# Patient Record
Sex: FEMALE | Race: BLACK OR AFRICAN AMERICAN | NOT HISPANIC OR LATINO
[De-identification: names, ages, dates, MRNs, and addresses within clinical notes are randomized per-mention and may not be internally consistent; named-entity substitution may affect disease eponyms.]

---

## 2017-04-04 ENCOUNTER — EXTERNAL RECORD (OUTPATIENT)
Dept: HEALTH INFORMATION MANAGEMENT | Facility: OTHER | Age: 52
End: 2017-04-04

## 2017-04-29 ENCOUNTER — EXTERNAL RECORD (OUTPATIENT)
Dept: HEALTH INFORMATION MANAGEMENT | Facility: OTHER | Age: 52
End: 2017-04-29

## 2017-04-30 ENCOUNTER — EXTERNAL RECORD (OUTPATIENT)
Dept: HEALTH INFORMATION MANAGEMENT | Facility: OTHER | Age: 52
End: 2017-04-30

## 2017-04-30 ENCOUNTER — EXTERNAL LAB (OUTPATIENT)
Dept: OTHER | Age: 52
End: 2017-04-30

## 2017-04-30 LAB — LAB RESULT: NORMAL

## 2017-08-21 ENCOUNTER — EXTERNAL RECORD (OUTPATIENT)
Dept: HEALTH INFORMATION MANAGEMENT | Facility: OTHER | Age: 52
End: 2017-08-21

## 2017-09-13 ENCOUNTER — EXTERNAL RECORD (OUTPATIENT)
Dept: HEALTH INFORMATION MANAGEMENT | Facility: OTHER | Age: 52
End: 2017-09-13

## 2017-09-25 ENCOUNTER — EXTERNAL RECORD (OUTPATIENT)
Dept: HEALTH INFORMATION MANAGEMENT | Facility: OTHER | Age: 52
End: 2017-09-25

## 2017-11-13 ENCOUNTER — EXTERNAL RECORD (OUTPATIENT)
Dept: HEALTH INFORMATION MANAGEMENT | Facility: OTHER | Age: 52
End: 2017-11-13

## 2017-11-27 ENCOUNTER — EXTERNAL RECORD (OUTPATIENT)
Dept: OTHER | Age: 52
End: 2017-11-27

## 2017-11-27 ENCOUNTER — EXTERNAL LAB (OUTPATIENT)
Dept: OTHER | Age: 52
End: 2017-11-27

## 2017-11-27 ENCOUNTER — EXTERNAL RECORD (OUTPATIENT)
Dept: HEALTH INFORMATION MANAGEMENT | Facility: OTHER | Age: 52
End: 2017-11-27

## 2017-11-27 LAB — LAB RESULT: NORMAL

## 2017-12-01 ENCOUNTER — EXTERNAL RECORD (OUTPATIENT)
Dept: HEALTH INFORMATION MANAGEMENT | Facility: OTHER | Age: 52
End: 2017-12-01

## 2018-01-15 ENCOUNTER — EXTERNAL RECORD (OUTPATIENT)
Dept: HEALTH INFORMATION MANAGEMENT | Facility: OTHER | Age: 53
End: 2018-01-15

## 2018-04-24 ENCOUNTER — EXTERNAL RECORD (OUTPATIENT)
Dept: HEALTH INFORMATION MANAGEMENT | Facility: OTHER | Age: 53
End: 2018-04-24

## 2018-12-05 ENCOUNTER — EXTERNAL RECORD (OUTPATIENT)
Dept: HEALTH INFORMATION MANAGEMENT | Facility: OTHER | Age: 53
End: 2018-12-05

## 2020-09-28 ENCOUNTER — EXTERNAL RECORD (OUTPATIENT)
Dept: HEALTH INFORMATION MANAGEMENT | Facility: OTHER | Age: 55
End: 2020-09-28

## 2021-05-21 LAB — COLONOSCOPY STUDY: NORMAL

## 2021-07-22 ENCOUNTER — EXTERNAL RECORD (OUTPATIENT)
Dept: HEALTH INFORMATION MANAGEMENT | Facility: OTHER | Age: 56
End: 2021-07-22

## 2021-07-26 ENCOUNTER — EXTERNAL RECORD (OUTPATIENT)
Dept: HEALTH INFORMATION MANAGEMENT | Facility: OTHER | Age: 56
End: 2021-07-26

## 2021-12-18 LAB — LAB RESULT: NORMAL

## 2021-12-21 ENCOUNTER — EXTERNAL RECORD (OUTPATIENT)
Dept: OTHER | Age: 56
End: 2021-12-21

## 2022-11-10 ENCOUNTER — OFFICE VISIT (OUTPATIENT)
Dept: INTERNAL MEDICINE | Age: 57
End: 2022-11-10

## 2022-11-10 VITALS
RESPIRATION RATE: 18 BRPM | TEMPERATURE: 96.9 F | HEART RATE: 80 BPM | HEIGHT: 66 IN | DIASTOLIC BLOOD PRESSURE: 80 MMHG | SYSTOLIC BLOOD PRESSURE: 152 MMHG | WEIGHT: 238 LBS | BODY MASS INDEX: 38.25 KG/M2 | OXYGEN SATURATION: 95 %

## 2022-11-10 DIAGNOSIS — E55.9 VITAMIN D DEFICIENCY: ICD-10-CM

## 2022-11-10 DIAGNOSIS — E53.8 VITAMIN B12 DEFICIENCY: ICD-10-CM

## 2022-11-10 DIAGNOSIS — R73.01 IMPAIRED FASTING GLUCOSE: ICD-10-CM

## 2022-11-10 DIAGNOSIS — E66.9 OBESITY WITH BODY MASS INDEX 30 OR GREATER: ICD-10-CM

## 2022-11-10 DIAGNOSIS — I10 HYPERTENSION, UNSPECIFIED TYPE: ICD-10-CM

## 2022-11-10 DIAGNOSIS — G47.30 SLEEP APNEA, UNSPECIFIED TYPE: ICD-10-CM

## 2022-11-10 DIAGNOSIS — N39.41 URGE INCONTINENCE OF URINE: ICD-10-CM

## 2022-11-10 DIAGNOSIS — E78.2 MIXED HYPERLIPIDEMIA: ICD-10-CM

## 2022-11-10 DIAGNOSIS — Z12.31 ENCOUNTER FOR SCREENING MAMMOGRAM FOR MALIGNANT NEOPLASM OF BREAST: ICD-10-CM

## 2022-11-10 DIAGNOSIS — Z00.00 ANNUAL PHYSICAL EXAM: Primary | ICD-10-CM

## 2022-11-10 DIAGNOSIS — Z82.49 FAMILY HISTORY OF BRAIN ANEURYSM: ICD-10-CM

## 2022-11-10 PROBLEM — M76.829 TIBIALIS POSTERIOR TENDINITIS: Status: ACTIVE | Noted: 2022-11-10

## 2022-11-10 PROBLEM — Z96.652 STATUS POST TOTAL LEFT KNEE REPLACEMENT: Status: ACTIVE | Noted: 2020-09-29

## 2022-11-10 PROBLEM — Z96.652 STATUS POST TOTAL LEFT KNEE REPLACEMENT: Status: RESOLVED | Noted: 2020-09-29 | Resolved: 2022-11-10

## 2022-11-10 PROBLEM — M76.829 TIBIALIS POSTERIOR TENDINITIS: Status: RESOLVED | Noted: 2022-11-10 | Resolved: 2022-11-10

## 2022-11-10 PROCEDURE — 93000 ELECTROCARDIOGRAM COMPLETE: CPT | Performed by: NURSE PRACTITIONER

## 2022-11-10 PROCEDURE — 3077F SYST BP >= 140 MM HG: CPT | Performed by: NURSE PRACTITIONER

## 2022-11-10 PROCEDURE — 99386 PREV VISIT NEW AGE 40-64: CPT | Performed by: NURSE PRACTITIONER

## 2022-11-10 PROCEDURE — 90471 IMMUNIZATION ADMIN: CPT | Performed by: NURSE PRACTITIONER

## 2022-11-10 PROCEDURE — 90750 HZV VACC RECOMBINANT IM: CPT | Performed by: NURSE PRACTITIONER

## 2022-11-10 PROCEDURE — 3079F DIAST BP 80-89 MM HG: CPT | Performed by: NURSE PRACTITIONER

## 2022-11-10 RX ORDER — TROSPIUM CHLORIDE ER 60 MG/1
60 CAPSULE ORAL
COMMUNITY

## 2022-11-10 RX ORDER — LOSARTAN POTASSIUM AND HYDROCHLOROTHIAZIDE 12.5; 5 MG/1; MG/1
1.5 TABLET ORAL DAILY
Qty: 135 TABLET | Refills: 1 | Status: SHIPPED | OUTPATIENT
Start: 2022-11-10 | End: 2023-06-07

## 2022-11-10 RX ORDER — TOPIRAMATE 25 MG/1
TABLET ORAL
COMMUNITY
End: 2022-11-10

## 2022-11-10 RX ORDER — LOSARTAN POTASSIUM AND HYDROCHLOROTHIAZIDE 12.5; 5 MG/1; MG/1
1.5 TABLET ORAL DAILY
COMMUNITY
End: 2022-11-10 | Stop reason: SDUPTHER

## 2022-11-10 RX ORDER — MULTIVIT-MIN/IRON/FOLIC ACID/K 18-600-40
CAPSULE ORAL
COMMUNITY

## 2022-11-10 RX ORDER — UBIDECARENONE 75 MG
50 CAPSULE ORAL DAILY
COMMUNITY

## 2022-11-10 RX ORDER — ROSUVASTATIN CALCIUM 10 MG/1
10 TABLET, COATED ORAL DAILY
COMMUNITY
End: 2023-03-07

## 2022-11-10 ASSESSMENT — ENCOUNTER SYMPTOMS
EYE REDNESS: 0
BACK PAIN: 0
WEAKNESS: 0
WOUND: 0
COUGH: 0
FATIGUE: 0
VOMITING: 0
BLOOD IN STOOL: 0
BRUISES/BLEEDS EASILY: 0
NERVOUS/ANXIOUS: 0
HEADACHES: 0
TROUBLE SWALLOWING: 0
APPETITE CHANGE: 0
POLYDIPSIA: 0
DIZZINESS: 0
UNEXPECTED WEIGHT CHANGE: 0
POLYPHAGIA: 0
SINUS PAIN: 0
CHEST TIGHTNESS: 0
ABDOMINAL PAIN: 0
DIARRHEA: 0
WHEEZING: 0
SHORTNESS OF BREATH: 0
SINUS PRESSURE: 0
CONSTIPATION: 0
SLEEP DISTURBANCE: 0
NAUSEA: 0
FEVER: 0
EYE PAIN: 0
CHILLS: 0
NUMBNESS: 0
SEIZURES: 0

## 2022-11-10 ASSESSMENT — PATIENT HEALTH QUESTIONNAIRE - PHQ9
CLINICAL INTERPRETATION OF PHQ2 SCORE: NO FURTHER SCREENING NEEDED
SUM OF ALL RESPONSES TO PHQ9 QUESTIONS 1 AND 2: 0
2. FEELING DOWN, DEPRESSED OR HOPELESS: NOT AT ALL
SUM OF ALL RESPONSES TO PHQ9 QUESTIONS 1 AND 2: 0
1. LITTLE INTEREST OR PLEASURE IN DOING THINGS: NOT AT ALL

## 2022-11-30 ENCOUNTER — TELEPHONE (OUTPATIENT)
Dept: SCHEDULING | Age: 57
End: 2022-11-30

## 2022-11-30 ENCOUNTER — TELEPHONE (OUTPATIENT)
Dept: INTERNAL MEDICINE | Age: 57
End: 2022-11-30

## 2022-11-30 ENCOUNTER — LAB SERVICES (OUTPATIENT)
Dept: INTERNAL MEDICINE | Age: 57
End: 2022-11-30

## 2022-11-30 DIAGNOSIS — Z00.00 ANNUAL PHYSICAL EXAM: ICD-10-CM

## 2022-11-30 DIAGNOSIS — E53.8 VITAMIN B12 DEFICIENCY: ICD-10-CM

## 2022-11-30 DIAGNOSIS — R73.01 IMPAIRED FASTING GLUCOSE: ICD-10-CM

## 2022-11-30 DIAGNOSIS — I10 HYPERTENSION, UNSPECIFIED TYPE: ICD-10-CM

## 2022-11-30 DIAGNOSIS — Z12.11 COLON CANCER SCREENING: Primary | ICD-10-CM

## 2022-11-30 DIAGNOSIS — Z12.11 COLON CANCER SCREENING: ICD-10-CM

## 2022-11-30 DIAGNOSIS — E55.9 VITAMIN D DEFICIENCY: ICD-10-CM

## 2022-11-30 PROCEDURE — 81003 URINALYSIS AUTO W/O SCOPE: CPT | Performed by: INTERNAL MEDICINE

## 2022-11-30 PROCEDURE — 83036 HEMOGLOBIN GLYCOSYLATED A1C: CPT | Performed by: INTERNAL MEDICINE

## 2022-11-30 PROCEDURE — 82607 VITAMIN B-12: CPT | Performed by: INTERNAL MEDICINE

## 2022-11-30 PROCEDURE — 82306 VITAMIN D 25 HYDROXY: CPT | Performed by: INTERNAL MEDICINE

## 2022-11-30 PROCEDURE — 80050 GENERAL HEALTH PANEL: CPT | Performed by: INTERNAL MEDICINE

## 2022-11-30 PROCEDURE — 36415 COLL VENOUS BLD VENIPUNCTURE: CPT | Performed by: NURSE PRACTITIONER

## 2022-11-30 PROCEDURE — 80061 LIPID PANEL: CPT | Performed by: INTERNAL MEDICINE

## 2022-12-01 ENCOUNTER — TELEPHONE (OUTPATIENT)
Dept: INTERNAL MEDICINE | Age: 57
End: 2022-12-01

## 2022-12-01 DIAGNOSIS — Z82.49 FAMILY HISTORY OF BRAIN ANEURYSM: Primary | ICD-10-CM

## 2022-12-01 DIAGNOSIS — R07.89 ATYPICAL CHEST PAIN: ICD-10-CM

## 2022-12-01 LAB
25(OH)D3+25(OH)D2 SERPL-MCNC: 41.8 NG/ML (ref 30–100)
ALBUMIN SERPL-MCNC: 4.2 G/DL (ref 3.6–5.1)
ALBUMIN/GLOB SERPL: 1.3 {RATIO} (ref 1–2.4)
ALP SERPL-CCNC: 94 UNITS/L (ref 45–117)
ALT SERPL-CCNC: 29 UNITS/L
ANION GAP SERPL CALC-SCNC: 10 MMOL/L (ref 7–19)
APPEARANCE UR: CLEAR
AST SERPL-CCNC: 15 UNITS/L
BASOPHILS # BLD: 0 K/MCL (ref 0–0.3)
BASOPHILS NFR BLD: 1 %
BILIRUB SERPL-MCNC: 0.5 MG/DL (ref 0.2–1)
BILIRUB UR QL STRIP: NEGATIVE
BUN SERPL-MCNC: 21 MG/DL (ref 6–20)
BUN/CREAT SERPL: 34 (ref 7–25)
CALCIUM SERPL-MCNC: 9.4 MG/DL (ref 8.4–10.2)
CHLORIDE SERPL-SCNC: 103 MMOL/L (ref 97–110)
CHOLEST SERPL-MCNC: 224 MG/DL
CHOLEST/HDLC SERPL: 2.8 {RATIO}
CO2 SERPL-SCNC: 30 MMOL/L (ref 21–32)
COLOR UR: ABNORMAL
CREAT SERPL-MCNC: 0.62 MG/DL (ref 0.51–0.95)
DEPRECATED RDW RBC: 39.7 FL (ref 39–50)
EOSINOPHIL # BLD: 0.1 K/MCL (ref 0–0.5)
EOSINOPHIL NFR BLD: 2 %
ERYTHROCYTE [DISTWIDTH] IN BLOOD: 12.3 % (ref 11–15)
FASTING DURATION TIME PATIENT: ABNORMAL H
FASTING DURATION TIME PATIENT: ABNORMAL H
GFR SERPLBLD BASED ON 1.73 SQ M-ARVRAT: >90 ML/MIN
GLOBULIN SER-MCNC: 3.3 G/DL (ref 2–4)
GLUCOSE SERPL-MCNC: 107 MG/DL (ref 70–99)
GLUCOSE UR STRIP-MCNC: NEGATIVE MG/DL
HBA1C MFR BLD: 5.7 % (ref 4.5–5.6)
HCT VFR BLD CALC: 41.2 % (ref 36–46.5)
HDLC SERPL-MCNC: 80 MG/DL
HGB BLD-MCNC: 14.2 G/DL (ref 12–15.5)
HGB UR QL STRIP: NEGATIVE
IMM GRANULOCYTES # BLD AUTO: 0 K/MCL (ref 0–0.2)
IMM GRANULOCYTES # BLD: 0 %
KETONES UR STRIP-MCNC: NEGATIVE MG/DL
LDLC SERPL CALC-MCNC: 122 MG/DL
LEUKOCYTE ESTERASE UR QL STRIP: NEGATIVE
LYMPHOCYTES # BLD: 2 K/MCL (ref 1–4)
LYMPHOCYTES NFR BLD: 48 %
MCH RBC QN AUTO: 30.4 PG (ref 26–34)
MCHC RBC AUTO-ENTMCNC: 34.5 G/DL (ref 32–36.5)
MCV RBC AUTO: 88.2 FL (ref 78–100)
MONOCYTES # BLD: 0.4 K/MCL (ref 0.3–0.9)
MONOCYTES NFR BLD: 10 %
NEUTROPHILS # BLD: 1.7 K/MCL (ref 1.8–7.7)
NEUTROPHILS NFR BLD: 39 %
NITRITE UR QL STRIP: NEGATIVE
NONHDLC SERPL-MCNC: 144 MG/DL
NRBC BLD MANUAL-RTO: 0 /100 WBC
PH UR STRIP: 6 [PH] (ref 5–7)
PLATELET # BLD AUTO: 366 K/MCL (ref 140–450)
POTASSIUM SERPL-SCNC: 3.9 MMOL/L (ref 3.4–5.1)
PROT SERPL-MCNC: 7.5 G/DL (ref 6.4–8.2)
PROT UR STRIP-MCNC: ABNORMAL MG/DL
RBC # BLD: 4.67 MIL/MCL (ref 4–5.2)
SODIUM SERPL-SCNC: 139 MMOL/L (ref 135–145)
SP GR UR STRIP: 1.03 (ref 1–1.03)
TRIGL SERPL-MCNC: 109 MG/DL
TSH SERPL-ACNC: 1.2 MCUNITS/ML (ref 0.35–5)
UROBILINOGEN UR STRIP-MCNC: 0.2 MG/DL
VIT B12 SERPL-MCNC: 1214 PG/ML (ref 211–911)
WBC # BLD: 4.3 K/MCL (ref 4.2–11)

## 2022-12-13 ENCOUNTER — EXTERNAL RECORD (OUTPATIENT)
Dept: OTHER | Age: 57
End: 2022-12-13

## 2022-12-15 ENCOUNTER — TELEPHONE (OUTPATIENT)
Dept: SCHEDULING | Age: 57
End: 2022-12-15

## 2022-12-15 ENCOUNTER — APPOINTMENT (OUTPATIENT)
Dept: CARDIOLOGY | Age: 57
End: 2022-12-15
Attending: NURSE PRACTITIONER

## 2022-12-16 ENCOUNTER — APPOINTMENT (OUTPATIENT)
Dept: CARDIOLOGY | Age: 57
End: 2022-12-16
Attending: NURSE PRACTITIONER

## 2022-12-20 ENCOUNTER — ANCILLARY PROCEDURE (OUTPATIENT)
Dept: CARDIOLOGY | Age: 57
End: 2022-12-20
Attending: NURSE PRACTITIONER

## 2022-12-20 ENCOUNTER — TELEPHONE (OUTPATIENT)
Dept: INTERNAL MEDICINE | Age: 57
End: 2022-12-20

## 2022-12-20 DIAGNOSIS — R07.89 ATYPICAL CHEST PAIN: ICD-10-CM

## 2022-12-20 DIAGNOSIS — R94.39 ABNORMAL STRESS TEST: Primary | ICD-10-CM

## 2022-12-20 LAB
HEART RATE RESERVE PREDICTED: 6.67 BPM
HM MAMMOGRAPHY BILATERAL: NORMAL
RESTING HR ACHIEVED: 72 BPM
STRESS BASELINE BP: NORMAL MMHG
STRESS PEAK HR: 154 BPM
STRESS PERCENT HR: 93 %
STRESS POST ESTIMATED WORKLOAD: 7 METS
STRESS POST EXERCISE DUR MIN: 5 MIN
STRESS POST PEAK BP: NORMAL MMHG
STRESS TARGET HR: 165 BPM

## 2022-12-20 PROCEDURE — 93015 CV STRESS TEST SUPVJ I&R: CPT | Performed by: INTERNAL MEDICINE

## 2022-12-29 ENCOUNTER — APPOINTMENT (OUTPATIENT)
Dept: CARDIOLOGY | Age: 57
End: 2022-12-29
Attending: NURSE PRACTITIONER

## 2022-12-29 ENCOUNTER — APPOINTMENT (OUTPATIENT)
Dept: NUCLEAR MEDICINE | Age: 57
End: 2022-12-29
Attending: NURSE PRACTITIONER

## 2022-12-31 ENCOUNTER — HOSPITAL ENCOUNTER (OUTPATIENT)
Dept: NUCLEAR MEDICINE | Age: 57
Discharge: HOME | End: 2022-12-31
Attending: NURSE PRACTITIONER

## 2022-12-31 ENCOUNTER — HOSPITAL ENCOUNTER (OUTPATIENT)
Dept: CARDIOLOGY | Age: 57
Discharge: HOME | End: 2022-12-31
Attending: NURSE PRACTITIONER

## 2022-12-31 VITALS — DIASTOLIC BLOOD PRESSURE: 106 MMHG | SYSTOLIC BLOOD PRESSURE: 163 MMHG | HEART RATE: 59 BPM

## 2022-12-31 DIAGNOSIS — R94.39 ABNORMAL STRESS TEST: ICD-10-CM

## 2022-12-31 DIAGNOSIS — R07.89 ATYPICAL CHEST PAIN: ICD-10-CM

## 2022-12-31 LAB — STRESS TARGET HR: 165 BPM

## 2022-12-31 PROCEDURE — 93016 CV STRESS TEST SUPVJ ONLY: CPT | Performed by: INTERNAL MEDICINE

## 2022-12-31 PROCEDURE — 93018 CV STRESS TEST I&R ONLY: CPT | Performed by: INTERNAL MEDICINE

## 2022-12-31 PROCEDURE — 93017 CV STRESS TEST TRACING ONLY: CPT

## 2022-12-31 PROCEDURE — 10006150 HB RX 343: Performed by: NURSE PRACTITIONER

## 2022-12-31 PROCEDURE — 78452 HT MUSCLE IMAGE SPECT MULT: CPT

## 2022-12-31 PROCEDURE — A9500 TC99M SESTAMIBI: HCPCS | Performed by: NURSE PRACTITIONER

## 2022-12-31 RX ORDER — TETRAKIS(2-METHOXYISOBUTYLISOCYANIDE)COPPER(I) TETRAFLUOROBORATE 1 MG/ML
18.8 INJECTION, POWDER, LYOPHILIZED, FOR SOLUTION INTRAVENOUS ONCE
Status: COMPLETED | OUTPATIENT
Start: 2022-12-31 | End: 2022-12-31

## 2022-12-31 RX ORDER — TETRAKIS(2-METHOXYISOBUTYLISOCYANIDE)COPPER(I) TETRAFLUOROBORATE 1 MG/ML
6.5 INJECTION, POWDER, LYOPHILIZED, FOR SOLUTION INTRAVENOUS ONCE
Status: COMPLETED | OUTPATIENT
Start: 2022-12-31 | End: 2022-12-31

## 2022-12-31 RX ADMIN — TETRAKIS(2-METHOXYISOBUTYLISOCYANIDE)COPPER(I) TETRAFLUOROBORATE 18.8 MILLICURIE: 1 INJECTION, POWDER, LYOPHILIZED, FOR SOLUTION INTRAVENOUS at 08:50

## 2022-12-31 RX ADMIN — TETRAKIS(2-METHOXYISOBUTYLISOCYANIDE)COPPER(I) TETRAFLUOROBORATE 6.5 MILLICURIE: 1 INJECTION, POWDER, LYOPHILIZED, FOR SOLUTION INTRAVENOUS at 07:05

## 2023-01-01 ENCOUNTER — EXTERNAL RECORD (OUTPATIENT)
Dept: OTHER | Age: 58
End: 2023-01-01

## 2023-01-06 ENCOUNTER — APPOINTMENT (OUTPATIENT)
Dept: CARDIOLOGY | Age: 58
End: 2023-01-06
Attending: NURSE PRACTITIONER

## 2023-03-07 RX ORDER — ROSUVASTATIN CALCIUM 10 MG/1
TABLET, COATED ORAL
Qty: 60 TABLET | Refills: 0 | Status: SHIPPED | OUTPATIENT
Start: 2023-03-07

## 2023-03-16 ENCOUNTER — TELEPHONE (OUTPATIENT)
Dept: INTERNAL MEDICINE | Age: 58
End: 2023-03-16

## 2023-03-16 RX ORDER — ADHESIVE BANDAGE 3/4"
BANDAGE TOPICAL
Qty: 1 EACH | Refills: 1 | Status: SHIPPED | OUTPATIENT
Start: 2023-03-16

## 2023-03-17 ENCOUNTER — TELEPHONE (OUTPATIENT)
Dept: INTERNAL MEDICINE | Age: 58
End: 2023-03-17

## 2023-04-04 ENCOUNTER — APPOINTMENT (OUTPATIENT)
Dept: INTERNAL MEDICINE | Age: 58
End: 2023-04-04

## 2023-04-07 ENCOUNTER — OFFICE VISIT (OUTPATIENT)
Dept: INTERNAL MEDICINE | Age: 58
End: 2023-04-07

## 2023-04-07 VITALS
DIASTOLIC BLOOD PRESSURE: 72 MMHG | HEIGHT: 66 IN | BODY MASS INDEX: 36.16 KG/M2 | SYSTOLIC BLOOD PRESSURE: 130 MMHG | TEMPERATURE: 97 F | WEIGHT: 225 LBS

## 2023-04-07 DIAGNOSIS — I10 HYPERTENSION, UNSPECIFIED TYPE: ICD-10-CM

## 2023-04-07 DIAGNOSIS — E66.9 OBESITY WITH BODY MASS INDEX 30 OR GREATER: ICD-10-CM

## 2023-04-07 DIAGNOSIS — R73.01 IMPAIRED FASTING GLUCOSE: ICD-10-CM

## 2023-04-07 DIAGNOSIS — E78.2 MIXED HYPERLIPIDEMIA: ICD-10-CM

## 2023-04-07 DIAGNOSIS — Z78.0 MENOPAUSE: Primary | ICD-10-CM

## 2023-04-07 DIAGNOSIS — Z23 NEED FOR SHINGLES VACCINE: ICD-10-CM

## 2023-04-07 DIAGNOSIS — Z12.4 PAP SMEAR FOR CERVICAL CANCER SCREENING: ICD-10-CM

## 2023-04-07 LAB
ALBUMIN SERPL-MCNC: 4.2 G/DL (ref 3.6–5.1)
ALBUMIN/GLOB SERPL: 1.4 {RATIO} (ref 1–2.4)
ALP SERPL-CCNC: 74 UNITS/L (ref 45–117)
ALT SERPL-CCNC: 41 UNITS/L
ANION GAP SERPL CALC-SCNC: 6 MMOL/L (ref 7–19)
AST SERPL-CCNC: 23 UNITS/L
BILIRUB SERPL-MCNC: 0.4 MG/DL (ref 0.2–1)
BUN SERPL-MCNC: 16 MG/DL (ref 6–20)
BUN/CREAT SERPL: 26 (ref 7–25)
CALCIUM SERPL-MCNC: 9.7 MG/DL (ref 8.4–10.2)
CHLORIDE SERPL-SCNC: 105 MMOL/L (ref 97–110)
CO2 SERPL-SCNC: 32 MMOL/L (ref 21–32)
CREAT SERPL-MCNC: 0.61 MG/DL (ref 0.51–0.95)
FASTING DURATION TIME PATIENT: ABNORMAL H
GFR SERPLBLD BASED ON 1.73 SQ M-ARVRAT: >90 ML/MIN
GLOBULIN SER-MCNC: 3.1 G/DL (ref 2–4)
GLUCOSE SERPL-MCNC: 108 MG/DL (ref 70–99)
HBA1C MFR BLD: 5.4 % (ref 4.5–5.6)
POTASSIUM SERPL-SCNC: 3.9 MMOL/L (ref 3.4–5.1)
PROT SERPL-MCNC: 7.3 G/DL (ref 6.4–8.2)
SODIUM SERPL-SCNC: 139 MMOL/L (ref 135–145)

## 2023-04-07 PROCEDURE — 87624 HPV HI-RISK TYP POOLED RSLT: CPT | Performed by: INTERNAL MEDICINE

## 2023-04-07 PROCEDURE — 3078F DIAST BP <80 MM HG: CPT | Performed by: NURSE PRACTITIONER

## 2023-04-07 PROCEDURE — 3075F SYST BP GE 130 - 139MM HG: CPT | Performed by: NURSE PRACTITIONER

## 2023-04-07 PROCEDURE — 36415 COLL VENOUS BLD VENIPUNCTURE: CPT | Performed by: NURSE PRACTITIONER

## 2023-04-07 PROCEDURE — 99214 OFFICE O/P EST MOD 30 MIN: CPT | Performed by: NURSE PRACTITIONER

## 2023-04-07 PROCEDURE — 80053 COMPREHEN METABOLIC PANEL: CPT | Performed by: INTERNAL MEDICINE

## 2023-04-07 PROCEDURE — 83036 HEMOGLOBIN GLYCOSYLATED A1C: CPT | Performed by: INTERNAL MEDICINE

## 2023-04-07 PROCEDURE — 80061 LIPID PANEL: CPT | Performed by: INTERNAL MEDICINE

## 2023-04-07 PROCEDURE — 88175 CYTOPATH C/V AUTO FLUID REDO: CPT | Performed by: INTERNAL MEDICINE

## 2023-04-07 RX ORDER — SEMAGLUTIDE 1.34 MG/ML
1 INJECTION, SOLUTION SUBCUTANEOUS
Qty: 3 ML | Refills: 3 | Status: SHIPPED | OUTPATIENT
Start: 2023-04-10 | End: 2023-08-02

## 2023-04-07 ASSESSMENT — PATIENT HEALTH QUESTIONNAIRE - PHQ9
SUM OF ALL RESPONSES TO PHQ9 QUESTIONS 1 AND 2: 0
SUM OF ALL RESPONSES TO PHQ9 QUESTIONS 1 AND 2: 0
1. LITTLE INTEREST OR PLEASURE IN DOING THINGS: NOT AT ALL
2. FEELING DOWN, DEPRESSED OR HOPELESS: NOT AT ALL
CLINICAL INTERPRETATION OF PHQ2 SCORE: NO FURTHER SCREENING NEEDED

## 2023-04-07 ASSESSMENT — PAIN SCALES - GENERAL: PAINLEVEL: 0

## 2023-04-08 LAB
CHOLEST SERPL-MCNC: 174 MG/DL
CHOLEST/HDLC SERPL: 2.5 {RATIO}
FASTING DURATION TIME PATIENT: NORMAL H
HDLC SERPL-MCNC: 71 MG/DL
LDLC SERPL CALC-MCNC: 86 MG/DL
NONHDLC SERPL-MCNC: 103 MG/DL
TRIGL SERPL-MCNC: 87 MG/DL

## 2023-04-10 ENCOUNTER — NURSE ONLY (OUTPATIENT)
Dept: INTERNAL MEDICINE | Age: 58
End: 2023-04-10

## 2023-04-10 DIAGNOSIS — Z23 NEED FOR SHINGLES VACCINE: Primary | ICD-10-CM

## 2023-04-10 PROCEDURE — 90750 HZV VACC RECOMBINANT IM: CPT | Performed by: NURSE PRACTITIONER

## 2023-04-10 PROCEDURE — 90471 IMMUNIZATION ADMIN: CPT | Performed by: NURSE PRACTITIONER

## 2023-04-11 LAB — HOLD SPECIMEN: NORMAL

## 2023-04-12 LAB
CASE RPRT: ABNORMAL
CLINICAL INFO: ABNORMAL
CYTOLOGY CVX/VAG STUDY: ABNORMAL
CYTOLOGY CVX/VAG STUDY: ABNORMAL
GEN CATEG CVX/VAG CYTO-IMP: ABNORMAL
HPV16+18+45 E6+E7MRNA CVX NAA+PROBE: NEGATIVE
Lab: NORMAL
PAP EDUCATIONAL NOTE: ABNORMAL
SPECIMEN ADEQUACY: ABNORMAL

## 2023-04-18 ENCOUNTER — TELEPHONE (OUTPATIENT)
Dept: INTERNAL MEDICINE | Age: 58
End: 2023-04-18

## 2023-04-20 ENCOUNTER — TELEPHONE (OUTPATIENT)
Dept: INTERNAL MEDICINE | Age: 58
End: 2023-04-20

## 2023-05-04 ENCOUNTER — CLINICAL ABSTRACT (OUTPATIENT)
Dept: INTERNAL MEDICINE | Age: 58
End: 2023-05-04

## 2023-05-08 ENCOUNTER — EXTERNAL RECORD (OUTPATIENT)
Dept: HEALTH INFORMATION MANAGEMENT | Facility: OTHER | Age: 58
End: 2023-05-08

## 2023-05-13 ENCOUNTER — EXTERNAL RECORD (OUTPATIENT)
Dept: INTERNAL MEDICINE | Age: 58
End: 2023-05-13

## 2023-05-17 ENCOUNTER — TELEPHONE (OUTPATIENT)
Dept: INTERNAL MEDICINE | Age: 58
End: 2023-05-17

## 2023-05-17 DIAGNOSIS — Z78.0 MENOPAUSE: ICD-10-CM

## 2023-06-07 RX ORDER — LOSARTAN POTASSIUM AND HYDROCHLOROTHIAZIDE 12.5; 5 MG/1; MG/1
TABLET ORAL
Qty: 135 TABLET | Refills: 1 | Status: SHIPPED | OUTPATIENT
Start: 2023-06-07 | End: 2023-10-18

## 2023-08-02 DIAGNOSIS — E66.9 OBESITY WITH BODY MASS INDEX 30 OR GREATER: ICD-10-CM

## 2023-08-02 DIAGNOSIS — R73.01 IMPAIRED FASTING GLUCOSE: ICD-10-CM

## 2023-08-02 RX ORDER — SEMAGLUTIDE 1.34 MG/ML
INJECTION, SOLUTION SUBCUTANEOUS
Qty: 3 ML | Refills: 3 | Status: SHIPPED | OUTPATIENT
Start: 2023-08-02

## 2023-08-07 ENCOUNTER — EXTERNAL RECORD (OUTPATIENT)
Dept: INTERNAL MEDICINE | Age: 58
End: 2023-08-07

## 2023-08-24 ENCOUNTER — E-ADVICE (OUTPATIENT)
Dept: INTERNAL MEDICINE | Age: 58
End: 2023-08-24

## 2023-08-24 DIAGNOSIS — G47.30 SLEEP APNEA, UNSPECIFIED TYPE: Primary | ICD-10-CM

## 2023-08-30 ENCOUNTER — E-ADVICE (OUTPATIENT)
Dept: INTERNAL MEDICINE | Age: 58
End: 2023-08-30

## 2023-08-30 ENCOUNTER — TELEPHONE (OUTPATIENT)
Dept: OBGYN | Age: 58
End: 2023-08-30

## 2023-09-06 DIAGNOSIS — Z87.891 FORMER SMOKER: Primary | ICD-10-CM

## 2023-09-14 ENCOUNTER — OFFICE VISIT (OUTPATIENT)
Dept: INTERNAL MEDICINE | Age: 58
End: 2023-09-14

## 2023-09-14 VITALS
BODY MASS INDEX: 35.67 KG/M2 | DIASTOLIC BLOOD PRESSURE: 70 MMHG | TEMPERATURE: 97 F | OXYGEN SATURATION: 96 % | HEART RATE: 69 BPM | WEIGHT: 221 LBS | SYSTOLIC BLOOD PRESSURE: 111 MMHG

## 2023-09-14 DIAGNOSIS — R39.9 UTI SYMPTOMS: ICD-10-CM

## 2023-09-14 DIAGNOSIS — I10 PRIMARY HYPERTENSION: ICD-10-CM

## 2023-09-14 DIAGNOSIS — G47.30 SLEEP APNEA, UNSPECIFIED TYPE: ICD-10-CM

## 2023-09-14 DIAGNOSIS — Z23 NEEDS FLU SHOT: ICD-10-CM

## 2023-09-14 DIAGNOSIS — R73.01 IMPAIRED FASTING GLUCOSE: ICD-10-CM

## 2023-09-14 DIAGNOSIS — R19.06 EPIGASTRIC MASS: ICD-10-CM

## 2023-09-14 DIAGNOSIS — E66.9 OBESITY WITH BODY MASS INDEX 30 OR GREATER: Primary | ICD-10-CM

## 2023-09-14 PROCEDURE — 81003 URINALYSIS AUTO W/O SCOPE: CPT | Performed by: INTERNAL MEDICINE

## 2023-09-14 PROCEDURE — 99214 OFFICE O/P EST MOD 30 MIN: CPT | Performed by: NURSE PRACTITIONER

## 2023-09-14 PROCEDURE — 90471 IMMUNIZATION ADMIN: CPT | Performed by: NURSE PRACTITIONER

## 2023-09-14 PROCEDURE — 3074F SYST BP LT 130 MM HG: CPT | Performed by: NURSE PRACTITIONER

## 2023-09-14 PROCEDURE — 90686 IIV4 VACC NO PRSV 0.5 ML IM: CPT | Performed by: NURSE PRACTITIONER

## 2023-09-14 PROCEDURE — 3078F DIAST BP <80 MM HG: CPT | Performed by: NURSE PRACTITIONER

## 2023-09-14 RX ORDER — NYSTATIN 100000 [USP'U]/G
1 POWDER TOPICAL 2 TIMES DAILY
Qty: 60 G | Refills: 1 | Status: SHIPPED | OUTPATIENT
Start: 2023-09-14

## 2023-09-15 LAB
APPEARANCE UR: CLEAR
BILIRUB UR QL STRIP: NEGATIVE
COLOR UR: YELLOW
GLUCOSE UR STRIP-MCNC: NEGATIVE MG/DL
HGB UR QL STRIP: NEGATIVE
KETONES UR STRIP-MCNC: NEGATIVE MG/DL
LEUKOCYTE ESTERASE UR QL STRIP: NEGATIVE
NITRITE UR QL STRIP: NEGATIVE
PH UR STRIP: 7.5 [PH] (ref 5–7)
PROT UR STRIP-MCNC: ABNORMAL MG/DL
SP GR UR STRIP: 1.03 (ref 1–1.03)
UROBILINOGEN UR STRIP-MCNC: 0.2 MG/DL

## 2023-09-19 ENCOUNTER — E-ADVICE (OUTPATIENT)
Dept: INTERNAL MEDICINE | Age: 58
End: 2023-09-19

## 2023-09-26 ENCOUNTER — TELEPHONE (OUTPATIENT)
Dept: INTERNAL MEDICINE | Age: 58
End: 2023-09-26

## 2023-09-26 ENCOUNTER — EXTERNAL RECORD (OUTPATIENT)
Dept: HEALTH INFORMATION MANAGEMENT | Facility: OTHER | Age: 58
End: 2023-09-26

## 2023-09-26 DIAGNOSIS — R19.06 EPIGASTRIC MASS: Primary | ICD-10-CM

## 2023-09-27 ENCOUNTER — E-ADVICE (OUTPATIENT)
Dept: INTERNAL MEDICINE | Age: 58
End: 2023-09-27

## 2023-09-28 ENCOUNTER — CLINICAL ABSTRACT (OUTPATIENT)
Dept: HEALTH INFORMATION MANAGEMENT | Facility: OTHER | Age: 58
End: 2023-09-28

## 2023-09-28 ENCOUNTER — APPOINTMENT (OUTPATIENT)
Dept: OBGYN | Age: 58
End: 2023-09-28

## 2023-09-29 ENCOUNTER — HOSPITAL ENCOUNTER (OUTPATIENT)
Dept: CARDIOLOGY | Age: 58
Discharge: HOME | End: 2023-09-29
Attending: INTERNAL MEDICINE

## 2023-09-29 ENCOUNTER — APPOINTMENT (OUTPATIENT)
Dept: GENERAL RADIOLOGY | Age: 58
End: 2023-09-29
Attending: INTERNAL MEDICINE

## 2023-09-29 ENCOUNTER — APPOINTMENT (OUTPATIENT)
Dept: RESPIRATORY THERAPY | Age: 58
End: 2023-09-29
Attending: INTERNAL MEDICINE

## 2023-09-29 DIAGNOSIS — Z87.891 FORMER SMOKER: ICD-10-CM

## 2023-09-29 LAB
AORTIC VALVE AREA (AVA): 0.84
AORTIC VALVE AREA: 2.7
ASCENDING AORTA (AAD): 3
AV MEAN GRADIENT (AVMG): 4
AV MEAN VELOCITY (AVMV): 0.93
AV PEAK GRADIENT (AVPG): 7
AV PEAK VELOCITY (AVPV): 1.34
AV STENOSIS SEVERITY TEXT: NORMAL
AVI LVOT PEAK GRADIENT (LVOTMG): 0.9
E WAVE DECELARATION TIME (MDT): 11.34
INTERVENTRICULAR SEPTUM IN END DIASTOLE (IVSD): 2.72
LEFT INTERNAL DIMENSION IN SYSTOLE (LVSD): 1
LEFT VENTRICULAR INTERNAL DIMENSION IN DIASTOLE (LVDD): 2.1
LEFT VENTRICULAR POSTERIOR WALL IN END DIASTOLE (LVPW): 3.8
LV EF: NORMAL %
LVOT 2D (LVOTD): 22.4
LVOT VTI (LVOTVTI): 0.82
MV E TISSUE VEL MED (MESV): 7.83
MV E WAVE VEL/E TISSUE VEL MED(MSR): 7.4
MV PEAK A VELOCITY (MVPAV): 292
MV PEAK E VELOCITY (MVPEV): 1.17
RV END SYSTOLIC LONGITUDINAL STRAIN FREE WALL (RVGS): 2.2
TRICUSPID VALVE ANNULAR PEAK VELOCITY (TVAPV): 27

## 2023-09-29 PROCEDURE — 93306 TTE W/DOPPLER COMPLETE: CPT

## 2023-10-03 ENCOUNTER — OFFICE VISIT (OUTPATIENT)
Dept: OBGYN | Age: 58
End: 2023-10-03

## 2023-10-03 VITALS
HEIGHT: 66 IN | WEIGHT: 219 LBS | HEART RATE: 81 BPM | BODY MASS INDEX: 35.2 KG/M2 | DIASTOLIC BLOOD PRESSURE: 80 MMHG | SYSTOLIC BLOOD PRESSURE: 130 MMHG

## 2023-10-03 DIAGNOSIS — R10.2 PELVIC PAIN IN FEMALE: ICD-10-CM

## 2023-10-03 DIAGNOSIS — N89.8 VAGINAL DISCHARGE: ICD-10-CM

## 2023-10-03 DIAGNOSIS — Z01.419 ENCOUNTER FOR GYNECOLOGICAL EXAMINATION WITHOUT ABNORMAL FINDING: Primary | ICD-10-CM

## 2023-10-03 DIAGNOSIS — Z11.3 SCREENING EXAMINATION FOR STD (SEXUALLY TRANSMITTED DISEASE): ICD-10-CM

## 2023-10-03 PROCEDURE — 87481 CANDIDA DNA AMP PROBE: CPT | Performed by: CLINICAL MEDICAL LABORATORY

## 2023-10-03 PROCEDURE — 87661 TRICHOMONAS VAGINALIS AMPLIF: CPT | Performed by: CLINICAL MEDICAL LABORATORY

## 2023-10-03 PROCEDURE — 86803 HEPATITIS C AB TEST: CPT | Performed by: CLINICAL MEDICAL LABORATORY

## 2023-10-03 PROCEDURE — 3079F DIAST BP 80-89 MM HG: CPT | Performed by: NURSE PRACTITIONER

## 2023-10-03 PROCEDURE — 87591 N.GONORRHOEAE DNA AMP PROB: CPT | Performed by: CLINICAL MEDICAL LABORATORY

## 2023-10-03 PROCEDURE — 99386 PREV VISIT NEW AGE 40-64: CPT | Performed by: NURSE PRACTITIONER

## 2023-10-03 PROCEDURE — 87340 HEPATITIS B SURFACE AG IA: CPT | Performed by: CLINICAL MEDICAL LABORATORY

## 2023-10-03 PROCEDURE — 87491 CHLMYD TRACH DNA AMP PROBE: CPT | Performed by: CLINICAL MEDICAL LABORATORY

## 2023-10-03 PROCEDURE — 87389 HIV-1 AG W/HIV-1&-2 AB AG IA: CPT | Performed by: CLINICAL MEDICAL LABORATORY

## 2023-10-03 PROCEDURE — 81513 NFCT DS BV RNA VAG FLU ALG: CPT | Performed by: CLINICAL MEDICAL LABORATORY

## 2023-10-03 PROCEDURE — 86592 SYPHILIS TEST NON-TREP QUAL: CPT | Performed by: CLINICAL MEDICAL LABORATORY

## 2023-10-03 PROCEDURE — 36415 COLL VENOUS BLD VENIPUNCTURE: CPT | Performed by: NURSE PRACTITIONER

## 2023-10-03 PROCEDURE — 3075F SYST BP GE 130 - 139MM HG: CPT | Performed by: NURSE PRACTITIONER

## 2023-10-03 ASSESSMENT — ENCOUNTER SYMPTOMS
COLOR CHANGE: 0
HEADACHES: 0
FEVER: 0
ABDOMINAL DISTENTION: 0
BRUISES/BLEEDS EASILY: 0
COUGH: 0
CHILLS: 0
NAUSEA: 0
SHORTNESS OF BREATH: 0
UNEXPECTED WEIGHT CHANGE: 0
CONFUSION: 0
ABDOMINAL PAIN: 0
ALLERGIC/IMMUNOLOGIC NEGATIVE: 1
NERVOUS/ANXIOUS: 0
SORE THROAT: 0
DIZZINESS: 0

## 2023-10-04 LAB
BACTERIAL VAGINOSIS VAG-IMP: NOT DETECTED
C ALBICANS DNA VAG QL NAA+PROBE: NOT DETECTED
C GLABRATA DNA VAG QL NAA+PROBE: NOT DETECTED
C TRACH RRNA SPEC QL NAA+PROBE: NEGATIVE
HBV SURFACE AG SER QL: NEGATIVE
HCV AB SER QL: NEGATIVE
HIV 1+2 AB+HIV1 P24 AG SERPL QL IA: NONREACTIVE
Lab: NORMAL
N GONORRHOEA RRNA SPEC QL NAA+PROBE: NEGATIVE
RPR SER QL: NONREACTIVE
SERVICE CMNT-IMP: NORMAL
T VAGINALIS DNA VAG QL NAA+PROBE: NOT DETECTED

## 2023-10-17 ENCOUNTER — EXTERNAL RECORD (OUTPATIENT)
Dept: HEALTH INFORMATION MANAGEMENT | Facility: OTHER | Age: 58
End: 2023-10-17

## 2023-10-18 RX ORDER — LOSARTAN POTASSIUM AND HYDROCHLOROTHIAZIDE 12.5; 5 MG/1; MG/1
TABLET ORAL
Qty: 135 TABLET | Refills: 1 | Status: SHIPPED | OUTPATIENT
Start: 2023-10-18

## 2023-10-27 ENCOUNTER — OFFICE VISIT (OUTPATIENT)
Dept: OBGYN | Age: 58
End: 2023-10-27

## 2023-10-27 DIAGNOSIS — R10.2 PELVIC PAIN IN FEMALE: ICD-10-CM

## 2023-10-27 PROCEDURE — 76830 TRANSVAGINAL US NON-OB: CPT | Performed by: OBSTETRICS & GYNECOLOGY

## 2023-11-01 ENCOUNTER — APPOINTMENT (OUTPATIENT)
Dept: OBGYN | Age: 58
End: 2023-11-01

## 2023-11-08 ENCOUNTER — TELEPHONIC VISIT (OUTPATIENT)
Dept: OBGYN | Age: 58
End: 2023-11-08

## 2023-11-08 DIAGNOSIS — R10.2 PELVIC PAIN IN FEMALE: Primary | ICD-10-CM

## 2023-11-08 PROCEDURE — 99212 OFFICE O/P EST SF 10 MIN: CPT | Performed by: NURSE PRACTITIONER

## 2023-11-14 ENCOUNTER — E-ADVICE (OUTPATIENT)
Dept: OBGYN | Age: 58
End: 2023-11-14

## 2023-11-27 ENCOUNTER — OFFICE VISIT (OUTPATIENT)
Facility: CLINIC | Age: 58
End: 2023-11-27

## 2023-11-27 VITALS
HEART RATE: 76 BPM | WEIGHT: 220.69 LBS | SYSTOLIC BLOOD PRESSURE: 137 MMHG | HEIGHT: 66 IN | DIASTOLIC BLOOD PRESSURE: 75 MMHG | BODY MASS INDEX: 35.47 KG/M2

## 2023-11-27 DIAGNOSIS — E66.9 OBESITY WITH BODY MASS INDEX 30 OR GREATER: ICD-10-CM

## 2023-11-27 DIAGNOSIS — R73.01 IMPAIRED FASTING GLUCOSE: ICD-10-CM

## 2023-11-27 DIAGNOSIS — R10.31 RLQ ABDOMINAL PAIN: Primary | ICD-10-CM

## 2023-11-27 RX ORDER — SEMAGLUTIDE 2.68 MG/ML
INJECTION, SOLUTION SUBCUTANEOUS
Qty: 3 ML | Refills: 1 | OUTPATIENT
Start: 2023-11-27

## 2023-11-27 RX ORDER — MECLIZINE HYDROCHLORIDE 25 MG/1
25 TABLET ORAL 3 TIMES DAILY PRN
COMMUNITY

## 2023-11-27 RX ORDER — MELOXICAM 15 MG/1
15 TABLET ORAL DAILY
COMMUNITY
Start: 2022-05-12

## 2023-11-27 RX ORDER — SEMAGLUTIDE 1.34 MG/ML
INJECTION, SOLUTION SUBCUTANEOUS
Qty: 3 ML | Refills: 3 | OUTPATIENT
Start: 2023-11-27

## 2023-11-27 RX ORDER — NYSTATIN 100000 [USP'U]/G
1 POWDER TOPICAL 2 TIMES DAILY
COMMUNITY
Start: 2023-09-14

## 2023-11-27 RX ORDER — LOSARTAN POTASSIUM AND HYDROCHLOROTHIAZIDE 12.5; 5 MG/1; MG/1
1 TABLET ORAL DAILY
Qty: 135 TABLET | Refills: 3 | Status: SHIPPED | OUTPATIENT
Start: 2023-11-27

## 2023-11-27 RX ORDER — LOSARTAN POTASSIUM AND HYDROCHLOROTHIAZIDE 12.5; 5 MG/1; MG/1
1.5 TABLET ORAL DAILY
COMMUNITY
Start: 2023-10-18

## 2023-11-27 RX ORDER — OMEPRAZOLE 40 MG/1
1 CAPSULE, DELAYED RELEASE ORAL DAILY
COMMUNITY
Start: 2022-03-24

## 2023-11-27 RX ORDER — ROSUVASTATIN CALCIUM 10 MG/1
10 TABLET, COATED ORAL NIGHTLY
COMMUNITY
Start: 2023-03-07

## 2023-11-27 RX ORDER — ROSUVASTATIN CALCIUM 10 MG/1
TABLET, COATED ORAL
Qty: 90 TABLET | Refills: 3 | Status: SHIPPED | OUTPATIENT
Start: 2023-11-27

## 2023-11-28 ENCOUNTER — EXTERNAL RECORD (OUTPATIENT)
Dept: HEALTH INFORMATION MANAGEMENT | Facility: OTHER | Age: 58
End: 2023-11-28

## 2023-11-28 ENCOUNTER — E-ADVICE (OUTPATIENT)
Dept: INTERNAL MEDICINE | Age: 58
End: 2023-11-28

## 2023-11-28 DIAGNOSIS — R10.2 PELVIC PAIN IN FEMALE: Primary | ICD-10-CM

## 2023-11-29 ENCOUNTER — APPOINTMENT (OUTPATIENT)
Dept: SLEEP MEDICINE | Age: 58
End: 2023-11-29
Attending: NURSE PRACTITIONER

## 2023-12-04 ENCOUNTER — PATIENT MESSAGE (OUTPATIENT)
Facility: CLINIC | Age: 58
End: 2023-12-04

## 2023-12-05 ENCOUNTER — PATIENT MESSAGE (OUTPATIENT)
Facility: CLINIC | Age: 58
End: 2023-12-05

## 2023-12-05 ENCOUNTER — TELEPHONE (OUTPATIENT)
Facility: CLINIC | Age: 58
End: 2023-12-05

## 2023-12-06 NOTE — TELEPHONE ENCOUNTER
From: Ted Factor  Sent: 12/5/2023 2:11 PM CST  To: [facility] Staff  Subject: Change location    Thank you so much Lefty Starks . For helping with this situation. It's. Kind of frustrating. Especially when I thought Nirmal MCCARTHY and EDY Burton is interchangeable. And Especially with the new name change that is happen today we are all supposed to be [place] under one umbrella. But anywhoo. .   The Fax number to [facility] DEPT at [phone] [facility] 5 my records, TO MY OFFICE THE NUMBER [phone]    Thank you again for your help    [place]

## 2023-12-09 ENCOUNTER — E-ADVICE (OUTPATIENT)
Dept: INTERNAL MEDICINE | Age: 58
End: 2023-12-09

## 2023-12-14 ENCOUNTER — TELEPHONE (OUTPATIENT)
Facility: CLINIC | Age: 58
End: 2023-12-14

## 2023-12-14 NOTE — TELEPHONE ENCOUNTER
CT ABDOMEN AND PELVIS WITH CONTRAST    Anatomical Region Laterality Modality   Abdomen -- Computed Tomography     Impression    IMPRESSION:  1. No acute CT findings to explain reported symptoms. 2.  Colonic diverticulosis without evidence of acute diverticulitis. Electronically Verified and Signed by Attending Radiologist: Angie Nuñez MD 12/11/2023 9:20 PM  This exam was dictated at [facility].  Narrative    HISTORY: R52: Pain,    TECHNIQUE:  CT scan of the abdomen and pelvis was obtained with IV contrast. Ingested oral contrast partially opacifies the bowel. CONTRAST: IOHEXOL 350 MG/ML IV SOLN: 100 mL    COMPARISON:  None    FINDINGS:    There is mild dependent atelectasis in the lower lobes. There is no pericardial effusion. The liver is normal in size. No focal hepatic lesion is identified. There is no biliary ductal dilatation. The gallbladder, spleen, pancreas, and bilateral adrenal glands are unremarkable. The kidneys enhance symmetrically. There is no hydronephrosis. There is symmetric excretion of contrast into the bilateral renal collecting systems on the delayed phase images. The urinary bladder is unremarkable. Uterus is absent. No suspicious adnexal mass is identified. There is no bowel obstruction. There is colonic diverticulosis without evidence of acute diverticulitis. Appendix is visualized and unremarkable in appearance. There is no focal bowel wall thickening. There is no abdominal aortic aneurysm. Celiac trunk, superior mesenteric artery, and portal vein are patent. There is no abdominal lymphadenopathy. There is no pelvic lymphadenopathy. There is no free fluid, free intraperitoneal air, or organized fluid collection. No discrete omental soft tissue nodularity is identified. No suspicious lytic or blastic osseous lesions are identified. Sclerotic lesion within the right iliac bone measuring 0.9 cm (2/65) is likely a benign bone island.  There are degenerative changes of the spine. Key: (S/I) = series number / image number  Procedure Note    Armida Quispe MD - 12/11/2023  Formatting of this note might be different from the original.  HISTORY: R52: Pain,    TECHNIQUE:  CT scan of the abdomen and pelvis was obtained with IV contrast. Ingested oral contrast partially opacifies the bowel. CONTRAST: IOHEXOL 350 MG/ML IV SOLN: 100 mL    COMPARISON:  None    FINDINGS:    There is mild dependent atelectasis in the lower lobes. There is no pericardial effusion. The liver is normal in size. No focal hepatic lesion is identified. There is no biliary ductal dilatation. The gallbladder, spleen, pancreas, and bilateral adrenal glands are unremarkable. The kidneys enhance symmetrically. There is no hydronephrosis. There is symmetric excretion of contrast into the bilateral renal collecting systems on the delayed phase images. The urinary bladder is unremarkable. Uterus is absent. No suspicious adnexal mass is identified. There is no bowel obstruction. There is colonic diverticulosis without evidence of acute diverticulitis. Appendix is visualized and unremarkable in appearance. There is no focal bowel wall thickening. There is no abdominal aortic aneurysm. Celiac trunk, superior mesenteric artery, and portal vein are patent. There is no abdominal lymphadenopathy. There is no pelvic lymphadenopathy. There is no free fluid, free intraperitoneal air, or organized fluid collection. No discrete omental soft tissue nodularity is identified. No suspicious lytic or blastic osseous lesions are identified. Sclerotic lesion within the right iliac bone measuring 0.9 cm (2/65) is likely a benign bone island. There are degenerative changes of the spine. Key: (S/I) = series number / image number    IMPRESSION  IMPRESSION:  1. No acute CT findings to explain reported symptoms.   2.  Colonic diverticulosis without evidence of acute diverticulitis.       Electronically Verified and Signed by Attending Radiologist: Cally Cedeño MD 12/11/2023 9:20 PM  This exam was dictated at [facility].  Exam End: 12/11/23  7:05 PM    Specimen Collected: 12/11/23  9:15 PM Last Resulted: 12/11/23  9:20 PM   Received From: [address] [place] [address]  Result Received: 12/14/23 11:13 AM

## 2023-12-15 NOTE — TELEPHONE ENCOUNTER
Thank you so much Lefty Hendersons. Lefty Nims or GI RNs, please call MsGabe Ulis Dubin to explain:    CT abdomen and pelvis from the [facility] [facility] dated 12/11/2023 looked great. That scan was performed to evaluate the right-sided abdominal pain. Findings were all good news:  Normal liver gallbladder spleen PANCREAS. Normal GI tract  Normal aorta  No lymphadenopathy meaning no enlarged lymph nodes, no free fluid, no suggestion of a tumor or cancer. This is great news. Her abdominal pain is probably something harmless, possibly muscular.     - cb

## 2023-12-15 NOTE — TELEPHONE ENCOUNTER
Hi Dr. Chicho Irvin advise below. Called patient, name/ verified. Relayed your message below regarding CT scan results. She still c/o lower abdominal pain intermittently described as \"sharp\" which goes up to 9-10/10 pain rating at times, same pain when you saw her on  23. She does not take pain medication, \" I just breathe through it\". She wants to know what to do with this ongoing pain and wants to follow up with you in the clinic. Please advise. Thank you.

## 2023-12-18 NOTE — TELEPHONE ENCOUNTER
Thank you Moe Jimenez. Reassuring CT scan abd/pelvis 12/11/2023 as below. Unfortunately, with a fairly chronic history of abdominal pain and multiple tests performed so far, no simple answers for this abdominal pain at this point. If we did not discuss in the office, heating pad may relieve this pain if/when it occurs while she is at home. Please offer and schedule follow-up visit with me in 2-3 months, okay to use RN hold spot.     - cb

## 2023-12-21 NOTE — TELEPHONE ENCOUNTER
I spoke to the pt and we scheduled a follow up appointment    Date time and location verified    Your Appointments      Friday January 12, 2024  2:30 PM  Follow Up Visit with Azam Velasquez, [address] [facility] ([place]) [address]  [phone]

## 2024-01-04 ENCOUNTER — CLINICAL ABSTRACT (OUTPATIENT)
Dept: HEALTH INFORMATION MANAGEMENT | Facility: OTHER | Age: 59
End: 2024-01-04

## 2024-01-05 ENCOUNTER — APPOINTMENT (OUTPATIENT)
Dept: GASTROENTEROLOGY | Age: 59
End: 2024-01-05

## 2024-01-12 ENCOUNTER — VIRTUAL PHONE E/M (OUTPATIENT)
Dept: GASTROENTEROLOGY | Facility: CLINIC | Age: 59
End: 2024-01-12
Payer: COMMERCIAL

## 2024-01-12 DIAGNOSIS — K57.30 DIVERTICULOSIS LARGE INTESTINE W/O PERFORATION OR ABSCESS W/O BLEEDING: ICD-10-CM

## 2024-01-12 DIAGNOSIS — R10.32 LLQ ABDOMINAL PAIN: ICD-10-CM

## 2024-01-12 DIAGNOSIS — R10.31 RLQ ABDOMINAL PAIN: Primary | ICD-10-CM

## 2024-01-12 PROCEDURE — 99443 PHONE E/M BY PHYS 21-30 MIN: CPT | Performed by: INTERNAL MEDICINE

## 2024-01-12 NOTE — PROGRESS NOTES
HPI:    Patient ID: Chayito Moses is a 58 year old woman with elevated BMI 35.6, previous medication list including meloxicam, losartan, metformin, rosuvastatin, Ozempic, omeprazole; surgical history including hysterectomy as below.  She was seen 6 weeks ago for further evaluation of RLQ and sometimes LLQ abdominal pain.    Virtual Telephone Check-In      Chayito Moses verbally consents to a Virtual/Telephone Check-In visit on 1/12/2024.    Patient understands and accepts financial responsibility for any deductible, co-insurance and/or co-pays associated with this service.    Duration of the service: 25 minutes      Summary of topics discussed: Low abdominal pain, positional; recent and future workup      I called Ms. Moses today for a virtual visit due to increment weather.    CT scan ordered last time:    CT abdomen and pelvis from the [facility] dated 12/11/2023 looked great.  That scan was performed to evaluate the right-sided abdominal pain.     Findings were all good news:  Normal liver gallbladder spleen PANCREAS.  Normal GI tract  Normal aorta  No lymphadenopathy meaning no enlarged lymph nodes, no free fluid, no suggestion of a tumor or cancer.       We reviewed very encouraging CT scan results 1 month ago.  No inflammatory process, no neoplasm.  Nothing seen in the gut.  Last colonoscopy examination was less than 3 years ago.    Ms. Moses remains very concerned with ongoing diffuse, bilateral lower abdominal pain.  This is an intense crampy discomfort which seems to be precipitated by movement.  Twisting her abdomen, getting up too quickly seems to trigger.  Pain can be severe.  She has occasionally taken tramadol for this concern.    Relieving factors:  Urinary/bladder antispasmodic medication below helps  Heating pad \"helps so much\"  Also responded very well to a cold water cryo treatments given to her as a birthday gift.  Unclear if she could manage cold baths on her own.  Now  seeing a chiropractor which I encouraged  As per previous notes, previously tried 2 sessions of pelvic physical therapy.  This was too intimate and \"not for me.\"    Relieved at reassuring CT scan above but very frustrated of no potential treatments other than the above.    Repeatedly comes back to this being pelvic pain.  Again asks about possible endometriosis/treatment.    Exam/objective  Bright lucid and appropriate.  Clear speech, no distress.  A & O x 3    Today's plan:  As below    Maverick Castellon MD       “Please note that the above virtual telephone visit was completed using two-way, real-time interactive audio communication.  This has been done in good alejo to provide continuity of care in the best interest of the provider-patient relationship, due to the ongoing Coronavirus COVID-19 public health crisis/national emergency and because of restrictions on face to face office visits.  There are limitations of this visit as no physical exam could be performed.  Every conscious effort was taken to allow for sufficient and adequate time.  This billing was spent on reviewing labs, medications, radiology tests and decision making.  Appropriate medical decision-making and tests are ordered as detailed in the plan of care above.”     ======================  Previous visit 2023:     Chayito Moses is a woman with elevated BMI 35.6, medication list including meloxicam, losartan, metformin, rosuvastatin, Ozempic, omeprazole; surgical history as below.  She presents for further evaluation of RLQ and sometimes LLQ abdominal pain.    Surgical and pelvic surgical history:   section delivery approximately   Bladder neck suspension surgery apparently sometime after the  surgery.  ?2009 Shaikh urethropexy per urogynecology notes below?  Open hysterectomy surgery apparently due to extreme fibroids in .  No mention of endometriosis which she has since questioned.  Ms. Moses believes that she  still has her ovaries intact and may just be entering menopause now.    Today, Ms. Moses describes a constant RLQ abdominal pain going on for the past 3-4 years.  Sometimes this is RLQ and sometimes it is diffuse low abdomen.  This does not change with eating or with bowel movements.  She repeatedly states that this is positional, seems to relate to twisting turning bending over.  No identified relieving factors.    No associated constipation.  Takes a probiotic for gut health.    Ms. Moses has seen nurse practitioner Polina García and more recently GYN nurse practitioner Monique Park past several years regarding multiple lower abdominal and pelvic symptoms.  She is referred to us for further evaluation.    Of note, Ms. Moses states that she did try pelvic therapy in the [place] system approximately 2004 - 2005.  That was a very unpleasant experience and she does not wish to repeat.    Ms. Moses has also seen urogynecology specialist Dr. Huitron as below.  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    Initially titrated up on the oxybutynin dose, then started Trospium XR for treatment of urinary urgency and incontinence with dramatic symptomatic response, very happy with the effect of this medication.    Reassuring limited abdominal ultrasound exam 9/26/2023 copied below.  This apparently was for evaluation of \"epigastric mass\" which was not discussed today.  Normal ultrasound exam gallbladder liver spleen right kidney.  No gallstones.    Though I am unable to fully view the encounter, Ms. Moses reports pelvic ultrasound with internal probe performed during office visit Monique Park 10/27/2023 which is listed in her documentation and billing.  Ms. Moses states normal exam that day.      Worked up in the  [place] system for bariatric gastric bypass surgery several years ago but decided against it.    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      Wt Readings from Last 20 Encounters:   11/27/23 220 lb 11.2 oz (100.1 kg)   10/07/20 238 lb (108 kg)         Previous EGD examination: ?  Previous colonoscopy(ies): May 2021    HPI    Review of Systems    =======================  Trospium Extended release (Sanctura XR)  60mg controls bladder spasm/ urinary incontinence.      Zelalem Huitron MD - 11/18/2022 12:30 PM CST      Chayito Moses presented today for cystourethroscopy and cystometry, for the evaluation of:  1. Urgency urinary incontinence  2. Stress urinary incontinence  3. Urinary urgency, frequency, nocturia  4. 2009 Shaikh urethropexy  ____________________________________________________  The procedure, risks, complications and alternatives were reviewed with the patient and she wishes to proceed. Informed consent form was reviewed with patient, questions answered, and consent signed prior to beginning procedure.  ____________________________________________________  There were no vitals taken for this visit.    In lithotomy position, the urethral meatus was prepped with Betadine before instrumentation.  Anesthetic used: 2% xylocaine jelly.    CYSTOURETHROSCOPY  Cystourethroscopy, with calibration and/or dilation, was performed in order to assess for urethral stricture or stenosis due to irritative lower urinary tract symptoms.    Urethral caliber was calibrated as >24 Japanese.  Urethroscopy and 70 degree cystoscopy were performed with findings as follows:    URETHRO-VESICAL JUNCTION:    Bladder Trigone: Normal  UV Junction: Normal  Urethra Proximal: Normal  Urethra Mid: Normal  Urethra Distal: Normal    Clear bilateral ureteral efflux was detected.    Mobility: moderate  Palpation of Urethra with Scope in Place: normal    All quadrants were carefully  inspected, with findings as follows:    normal urothelium without abnormal erythema, anatomic lesions or glomerulations, and normal ureteral efflux.    FINDINGS: normal anatomy, no urethral stenosis or stricture or outlet obstruction      I performed the cmg/ucpp, and I interpreted the tracing as:  1. Detrusor overactivity 13 cm H2O at assisted 219 mL  2. No urodynamic stress urinary incontinence    ________________________________________________________________________  The patient received a single-dose antibiotic: macrobid 100mg before leaving the office.  ________________________________________________________________________    IMPRESSION:  1. Cystourethroscopy: normal urethral and bladder anatomy with no lesions, stones, strictures/stenosis, or foreign bodies. Bilateral ureteral efflux of clear urine visualized.  2. Cystometry: detrusor overactivity 13 cm H2O at assisted 219 mL; no urodynamic stress urinary incontinence    ZELALEM FLORENTINO MD      Electronically signed by Zelalem Florentino MD at 11/21/2022 7:10 PM CST         Zelalem Florentino MD - 11/18/2022 12:30 PM CST    Following completion of cystoscopy and cmg/ucpp, the test results were utilized for counseling the patient on treatment options for her symptoms.    Chayito is followed for:  1. Detrusor overactivity with urgency urinary incontinence, urinary urgency, frequency, nocturia  2. Stress urinary incontinence (on urodynamic stress urinary incontinence)  3. 2009 Shaikh urethropexy    Chayito and I reviewed the following together:  1. The tracing from her cystometry/ucpp  2. The details of her cystoscopy    I explained my interpretation of cmg/ucpp the tracing and her cystoscopy.    NEW COUNSELING:    Detrusor overactivity (N32.81), Urgency urinary incontinence (N39.41), Urinary urgency (R39.15), Urinary frequency (R39.5), Nocturia (R35.1)  During this visit we discussed the nature of and anatomy related to detrusor overactivity. We reviewed a wide range of  treatment options for detrusor overactivity including dietary triggers, behavioral retraining and bladder drills, oral medications, pelvic floor physiotherapy and exercise to assist with urge suppression, cystoscopic botulinum toxin injections, percutaneous tibial nerve stimulation (PTNS), and sacral neuromodulation (Interstim) therapy. Efficacy and side effects, potential adverse events were reviewed.  Questions answered, and patient states she understand the nature of the treatments, risks/benefits. She would like to proceed with a higher dosoe of oxybutynin.    Stress urinary incontinence (N39.3)  We discussed the anatomy related to and the nature of stress urinary incontinence. Treatment options were reviewed: expectant, pelvic floor muscle exercises on one's own, pelvic floor physical therapy, pessary, urethral bulking, and surgery with either a midurethral sling, autologous fascial sling, or Shaikh urethropexy. Risks and subjective and objective success rates for each option were reviewed, and questions answered.  Chayito Moses expressed a clear understanding of the alternatives discussed during this visit and the fully elective nature of the surgery. She would like to proceed with expectant management.    PLAN:  1. Increase oxybutynin ER to 15 mg daily for detrusor overactivity.  2. Chayito would like to expectantly manage her stress urinary incontinence.    RTC 1 month estab brief void/pvr    Medical Decision Making:  Number and Complexity of Problems: moderate  Amount and Complexity of Data to be Reviewed and Analyzed: none  Risk of complication and/or Morbdity or Mortality of Patient Management: moderate    Electronically signed by Zelalem Huitron MD at 11/21/2022 7:10 PM CST      =======================  [facility]     US ABDOMEN LTD    Jett Ball MD - 09/26/2023    HISTORY: R19.06: Epigastric mass    TECHNIQUE: Right upper quadrant abdominal ultrasound.    COMPARISON:  None.    FINDINGS:    LIVER:  Surface:  Smooth.  Echogenicity:  Normal.  Biliary Dilatation:  None.  Lesions:  None.  Size:  14.9 cm, WNL.    PANCREAS:  Visualized portion WNL.  Portions of the head and tail are obscured by bowel gas.    GALLBLADDER: Phrygian cap variant.  Shadowing stones:  None.  Polyp:  None.  Wall thickening:  None.  Adjacent fluid:  None.  Lofton's sign:  None.    COMMON DUCT:  2-3 mm, WNL.    SPLEEN:  9.5 cm in length, WNL.    RIGHT KIDNEY:  Survey images WNL.      IMPRESSION:  Ultrasound exam is within normal limits.    Electronically Verified and Signed by Attending Radiologist: Jett Ball MD 9/26/2023 11:34 AM    This exam was dictated within [facility].      Last Resulted: 09/26/23 11:34 AM  Received From: [facility]    =======================    CT abdomen and pelvis from the [facility] dated 12/11/2023 looked great.  That scan was performed to evaluate the right-sided abdominal pain.     Findings were all good news:  Normal liver gallbladder spleen PANCREAS.  Normal GI tract  Normal aorta  No lymphadenopathy meaning no enlarged lymph nodes, no free fluid, no suggestion of a tumor or cancer.         [facility]    CT ABDOMEN AND PELVIS WITH CONTRAST    Twan Bradford MD - 12/11/2023    HISTORY: R52: Pain,    TECHNIQUE:  CT scan of the abdomen and pelvis was obtained with IV contrast. Ingested oral contrast partially opacifies the bowel.    CONTRAST: IOHEXOL 350 MG/ML IV SOLN: 100 mL    COMPARISON:  None    FINDINGS:    There is mild dependent atelectasis in the lower lobes. There is no pericardial effusion.    The liver is normal in size. No focal hepatic lesion is identified. There is no biliary ductal dilatation. The gallbladder, spleen, pancreas, and bilateral adrenal glands are unremarkable.    The kidneys enhance symmetrically. There is no hydronephrosis.  There is symmetric excretion of contrast into the bilateral renal collecting systems on the delayed phase images. The urinary bladder is unremarkable. Uterus is absent. No suspicious adnexal mass is identified.    There is no bowel obstruction. There is colonic diverticulosis without evidence of acute diverticulitis. Appendix is visualized and unremarkable in appearance. There is no focal bowel wall thickening.    There is no abdominal aortic aneurysm. Celiac trunk, superior mesenteric artery, and portal vein are patent.    There is no abdominal lymphadenopathy. There is no pelvic lymphadenopathy. There is no free fluid, free intraperitoneal air, or organized fluid collection. No discrete omental soft tissue nodularity is identified.    No suspicious lytic or blastic osseous lesions are identified. Sclerotic lesion within the right iliac bone measuring 0.9 cm (2/65) is likely a benign bone island. There are degenerative changes of the spine.    Key: (S/I) = series number / image number        IMPRESSION:  1.  No acute CT findings to explain reported symptoms.  2.  Colonic diverticulosis without evidence of acute diverticulitis.      Electronically Verified and Signed by Attending Radiologist: Twan Bradford MD 2023 9:20 PM  This exam was dictated at [facility].  Exam End: 23  7:05 PM   Last Resulted: 23  9:20 PM  Received From: [facility]        =======================    Dank Pemberton MD - 2021 1:45 PM CDT      [facility] ENDOS[facility]  Colonoscopy Operative Report     1965 MRN [id number]    Location GASTROENTEROLOGY - JOSE LUIS OCONNOR, TETE LI    Pre-Operative Diagnosis: Colorectal cancer screening    Post-Operative Diagnosis:  Diverticulosis  Internal hemorrhoids    Procedure Performed: Colonoscopy to cecum    Informed Consent: Informed consent for both the procedure and sedation were obtained from the patient. The potentially  life-threatening complications of sedation, bleeding, perforation, transfusion or repeat endoscopy were reviewed along with the possible need for hospitalization, surgical management, transfusion or repeat endoscopy should one of these complications arise. The patient understands and is agreeable to proceed.    Sedation Type: MAC    Cecum Withdrawal Time: 8 minutes 30 seconds    Date of previous colonoscopy: None    Procedure Description: The patient was placed in the left lateral decubitus position. After careful digital rectal examination, the colonoscope was inserted into the rectum and advanced to the level of the cecum under direct visualization. The cecum was identified by landmarks, including the appendiceal orifice and ileoceccal valve. Careful examination of the entire colon was performed during withdrawal of the endoscope. The scope was withdrawn to the rectum and retroflexion was performed. The patient tolerated the procedure well with no immediate complications. The patient was transferred to the recovery area in stable condition.    Quality of Preparation: Adequate    Aronchick Bowel Prep Scale:  1    Findings: There are multiple scattered diverticuli in the sigmoid colon. There are grade 1 internal hemorrhoids    Recommendations: Resume prior diet and medications usual    Follow-up: Repeat exam in 10 years    Discharge: The patient was given an after visit summary detailing the procedure, findings, recommendations and follow up plans.    PRESTON CÁRDENAS  5/26/2021  1:35 PM          Current Outpatient Medications   Medication Sig Dispense Refill    losartan-hydroCHLOROthiazide 50-12.5 MG Oral Tab Take 1.5 tablets by mouth daily.      meclizine 25 MG Oral Tab Take 1 tablet (25 mg total) by mouth 3 (three) times daily as needed.      Meloxicam 15 MG Oral Tab Take 1 tablet (15 mg total) by mouth daily.      metFORMIN 500 MG Oral Tab Take 1 tablet (500 mg total) by mouth daily with breakfast.      Nystatin  170830 UNIT/GM External Powder Apply 1 Application topically 2 (two) times daily.      Omeprazole 40 MG Oral Capsule Delayed Release Take 1 capsule (40 mg total) by mouth daily.      rosuvastatin 10 MG Oral Tab Take 1 tablet (10 mg total) by mouth nightly.      semaglutide 8 MG/3ML Subcutaneous Solution Pen-injector Inject 2 mg into the skin once a week.      Cholecalciferol 50 MCG (2000 UT) Oral Tab Take 1 tablet (2,000 Units total) by mouth daily.           Allergies:  Allergies   Allergen Reactions    Adhesive Tape RASH and OTHER (SEE COMMENTS)    Morphine ITCHING and OTHER (SEE COMMENTS)     Imaging: No results found.       PHYSICAL EXAM:   Physical Exam         ASSESSMENT/PLAN:     Chayito Moses is a 58 year old woman with elevated BMI 35.6, medication list including meloxicam, losartan, metformin, rosuvastatin, Ozempic, omeprazole; surgical history as below.  She presents for further evaluation of RLQ and sometimes LLQ abdominal pain.    Surgical and pelvic surgical history:   section delivery approximately   Bladder neck suspension surgery apparently sometime after the  surgery.  ?2009 Shaikh urethropexy per urogynecology notes below?  Open hysterectomy surgery apparently due to extreme fibroids in .  No mention of endometriosis which she has since questioned.  Ms. Moses believes that she still has her ovaries intact and may just be entering menopause now.    Today, Ms. Moses describes a constant RLQ abdominal pain going on for the past 3-4 years.  Sometimes this is RLQ and sometimes it is diffuse low abdomen.  This does not change with eating or with bowel movements.  She repeatedly states that this is positional, seems to relate to twisting turning bending over.  No identified relieving factors.    No associated constipation.  Takes a probiotic for gut health.    Ms. Moses has seen nurse practitioner Polina García and more recently GYN nurse practitioner Monique Park past several  years regarding multiple lower abdominal and pelvic symptoms.  She is referred to us for further evaluation.    Of note, Ms. Moses states that she did try pelvic therapy in the [facility] system approximately 2004 - 2005.  That was a very unpleasant experience and she does not wish to repeat.    Ms. Moses has also seen urogynecology specialist Dr. Huitron as below.  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    Initially titrated up on the oxybutynin dose, then started Trospium XR for treatment of urinary urgency and incontinence with dramatic symptomatic response, very happy with the effect of this medication.    Reassuring limited abdominal ultrasound exam 9/26/2023 copied below.  This apparently was for evaluation of \"epigastric mass\" which was not discussed today.  Normal ultrasound exam gallbladder liver spleen right kidney.  No gallstones.    Though I am unable to fully view the encounter, Ms. Moses reports pelvic ultrasound with internal probe performed during office visit Monique Park 10/27/2023 which is listed in her documentation and billing.  Ms. Moses states normal exam that day.      Worked up in the [place] system for bariatric gastric bypass surgery several years ago but decided against it.    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    Reassuring CT scan to evaluate these symptoms 12/11/2023 as above.    Contacted for virtual follow-up visit 1/12/2024 regarding ongoing symptoms.    Suggest:    Chronic RLQ and LLQabd pain    Reassuring clinical course and description primarily of positional type pain  Complex abdominal and pelvic surgical history outlined above; NO mention of endometriosis at time of open hysterectomy surgery  Recent  evaluation by urogynecology specialist with cystourethroscopy/cystometry exam as above  Reassuring limited abdominal ultrasound exam 9/26/2023 and office pelvic ultrasound exam 10/27/2023; CT scan 12/11/2023 as above  Currently takes a probiotic for occasional mild constipation; could consider bowel regimen in the future.  Strongly suspect musculoskeletal pain of uncertain etiology.  Ms. Moses is skeptical.  Nonetheless, significant relief from heating pad, even the cold water cryotherapy after [place].  Now seeing chiropractor as above.  We reviewed 5/26/2021 colonoscopy examination which was reassuring 2.5 years ago.  No inflammatory bowel disease or cause identified for RLQ abdominal pain.  I explained that at this point only remaining GI workup would be another colonoscopy examination.  I am a bit skeptical that repeat colonoscopy exam less than 3 years later would show any new findings to explain these chronic symptoms.  Ms. Moses will follow-up with her pelvic specialist and [place] back with us.  May benefit from referral to Pain Clinic and local nerve block/injections    2.  Elevated BMI    BMI Readings from Last 5 Encounters:   11/27/23 35.62 kg/m²   10/07/20 38.41 kg/m²      Worked up in the [place] system for bariatric gastric bypass surgery several years ago but decided against it.  Currently on Ozempic therapy    3.  Colon cancer screening, average risk  Reassuring colonoscopy examination May 2021 as above.  10-year follow-up screening colonoscopy examination recommended at that point.  As above.            Prior to this encounter, I spent over 10 minutes preparing for the visit, including reviewing documents from other specialties as well as from PCP and going over test results. During and after the face to face encounter, I spent an additional 30 minutes discussing the above and counseling this patient, reviewing chart notes and data with patient and composing this note.       Digital  transcription software was utilized to produce this note. The note was proofread for content only. Typographical errors may remain.        Meds This Visit:  Requested Prescriptions      No prescriptions requested or ordered in this encounter       Imaging & Referrals:  None       ID#[id number]

## 2024-01-13 ENCOUNTER — PATIENT MESSAGE (OUTPATIENT)
Dept: GASTROENTEROLOGY | Facility: CLINIC | Age: 59
End: 2024-01-13

## 2024-01-16 NOTE — TELEPHONE ENCOUNTER
Hello OB/Gyne RN's,    Can you please assist this pt with a sooner appt to see Dr Perez for chronic RLQ and LLQ abdominal pain per her "[employer]t message below.    Pt had a virtual phone visit with Dr Castellon on 01/12/2024. He did refer her to see Dr. Perez    Thanks,    ELIZABETH Lyle  GI Clinical Staff

## 2024-01-16 NOTE — TELEPHONE ENCOUNTER
Message to CAP to please see message below. GI MD (Dr Castellon) is referring pt to you for chronic RLQ and LLQ abdominal pain. OK to use any 20 min slot to get pt in for appt? Please advise.

## 2024-01-16 NOTE — TELEPHONE ENCOUNTER
From: Chayito Moses  To: Maverick Castellon  Sent: 1/13/2024 9:27 AM CST  Subject: Abdomen pelvic area pain     Good morning Dr. Castellon    I made an appointment with Dr. Perez she is booked out all the way to July.. super far out. I asked the  could she send note to Dr. Perez that I'm in chronic and consistent pain and I was referred by you.   But unfortunately she said she could not send a message to Dr. Perez .   The  said that you would have to send Dr. Perez a message if you felt it was urgent enough to be seen sooner .   So im asking if you could please send her message to at least be soon sooner . If you could for me .  Thank you in advance for your help     Chayito Moses

## 2024-01-18 NOTE — TELEPHONE ENCOUNTER
Since pt is new to me then please offer her the first available appt with any of the MD providers

## 2024-02-06 ENCOUNTER — APPOINTMENT (OUTPATIENT)
Dept: INTERNAL MEDICINE | Age: 59
End: 2024-02-06

## 2024-02-22 ENCOUNTER — TELEPHONE (OUTPATIENT)
Dept: OBGYN CLINIC | Facility: CLINIC | Age: 59
End: 2024-02-22

## 2024-02-22 ENCOUNTER — OFFICE VISIT (OUTPATIENT)
Dept: OBGYN CLINIC | Facility: CLINIC | Age: 59
End: 2024-02-22

## 2024-02-22 VITALS
SYSTOLIC BLOOD PRESSURE: 126 MMHG | DIASTOLIC BLOOD PRESSURE: 79 MMHG | HEART RATE: 97 BPM | WEIGHT: 224.31 LBS | BODY MASS INDEX: 36 KG/M2

## 2024-02-22 DIAGNOSIS — R10.2 CHRONIC PELVIC PAIN IN FEMALE: ICD-10-CM

## 2024-02-22 DIAGNOSIS — Z01.419 ENCOUNTER FOR GYNECOLOGICAL EXAMINATION WITHOUT ABNORMAL FINDING: Primary | ICD-10-CM

## 2024-02-22 DIAGNOSIS — Z12.31 SCREENING MAMMOGRAM FOR BREAST CANCER: ICD-10-CM

## 2024-02-22 DIAGNOSIS — G89.29 CHRONIC PELVIC PAIN IN FEMALE: ICD-10-CM

## 2024-02-22 DIAGNOSIS — N89.8 VAGINAL ODOR: ICD-10-CM

## 2024-02-22 DIAGNOSIS — N89.8 VAGINAL DISCHARGE: ICD-10-CM

## 2024-02-22 DIAGNOSIS — Z12.4 SCREENING FOR MALIGNANT NEOPLASM OF CERVIX: ICD-10-CM

## 2024-02-22 PROCEDURE — 99386 PREV VISIT NEW AGE 40-64: CPT | Performed by: OBSTETRICS & GYNECOLOGY

## 2024-02-22 PROCEDURE — 99203 OFFICE O/P NEW LOW 30 MIN: CPT | Performed by: OBSTETRICS & GYNECOLOGY

## 2024-02-23 LAB
BV BACTERIA DNA VAG QL NAA+PROBE: NEGATIVE
C GLABRATA DNA VAG QL NAA+PROBE: NEGATIVE
C KRUSEI DNA VAG QL NAA+PROBE: NEGATIVE
CANDIDA DNA VAG QL NAA+PROBE: NEGATIVE
HPV I/H RISK 1 DNA SPEC QL NAA+PROBE: NEGATIVE
T VAGINALIS DNA VAG QL NAA+PROBE: NEGATIVE

## 2024-02-23 NOTE — PROGRESS NOTES
Subjective:   Patient ID: Chayito Moses is a 58 year old female.    HPI   and amenorrheic since supracervical hysterectomy for fibroids in  at [facility]. She also had a mid-urethral sling at a later date at [facility]. She has no further ANNA. She believes she started menopause symptoms around age 51. Never used ERT. She is here c/o 2 years of BLQ pain which is intermittently severe. She has seen several Drs since hers retired. US in 2023 was normal post supracervical hysterectomy. CT in December was also normal. She saw Dr Castellon who suggested she see gyne maggie to decision on lower endoscopy since she is UTD on screening. Remainder of ROS significant for OAB improved with medication.     History/Other:   Review of Systems   Constitutional:  Negative for appetite change, fatigue and unexpected weight change.   Eyes:  Negative for visual disturbance.   Respiratory:  Negative for cough and shortness of breath.    Cardiovascular:  Negative for chest pain, palpitations and leg swelling.   Gastrointestinal:  Negative for abdominal distention, abdominal pain, blood in stool, constipation and diarrhea.   Genitourinary:  Positive for dyspareunia (at rarer times mimicking exisiting pain complaints). Negative for dysuria, frequency, pelvic pain and urgency.   Musculoskeletal:  Negative for arthralgias and myalgias.   Skin:  Negative for rash.   Neurological:  Negative for weakness, numbness and headaches.   Psychiatric/Behavioral:  Negative for dysphoric mood. The patient is not nervous/anxious.      Current Outpatient Medications   Medication Sig Dispense Refill    losartan-hydroCHLOROthiazide 50-12.5 MG Oral Tab Take 1.5 tablets by mouth daily.      meclizine 25 MG Oral Tab Take 1 tablet (25 mg total) by mouth 3 (three) times daily as needed.      Meloxicam 15 MG Oral Tab Take 1 tablet (15 mg total) by mouth daily.      Nystatin 124305 UNIT/GM External Powder Apply 1 Application topically 2 (two) times daily.       Omeprazole 40 MG Oral Capsule Delayed Release Take 1 capsule (40 mg total) by mouth daily.      rosuvastatin 10 MG Oral Tab Take 1 tablet (10 mg total) by mouth nightly.      semaglutide 8 MG/3ML Subcutaneous Solution Pen-injector Inject 2 mg into the skin once a week.      metFORMIN 500 MG Oral Tab Take 1 tablet (500 mg total) by mouth daily with breakfast. (Patient not taking: Reported on 2/22/2024)      Cholecalciferol 50 MCG (2000 UT) Oral Tab Take 1 tablet (2,000 Units total) by mouth daily. (Patient not taking: Reported on 2/22/2024)       Allergies:  Allergies   Allergen Reactions    Adhesive Tape RASH and OTHER (SEE COMMENTS)    Morphine ITCHING and OTHER (SEE COMMENTS)       Objective:   Physical Exam  Constitutional:       General: She is not in acute distress.     Appearance: She is well-developed. She is not diaphoretic.   Neck:      Thyroid: No thyromegaly.   Cardiovascular:      Rate and Rhythm: Normal rate and regular rhythm.      Heart sounds: Normal heart sounds. No murmur heard.  Pulmonary:      Effort: Pulmonary effort is normal.      Breath sounds: Normal breath sounds. No wheezing or rales.   Chest:   Breasts:     Right: Normal. No mass, nipple discharge, skin change or tenderness.      Left: Normal. No mass, nipple discharge, skin change or tenderness.      Comments:     Abdominal:      General: There is no distension.      Palpations: Abdomen is soft. There is no mass.      Tenderness: There is no abdominal tenderness. There is no guarding or rebound.   Genitourinary:     Labia:         Right: No rash or lesion.         Left: No rash or lesion.       Vagina: Normal. No vaginal discharge.      Cervix: Normal. No cervical motion tenderness or discharge.      Uterus: Absent.       Adnexa:         Right: No mass, tenderness or fullness.          Left: No mass, tenderness or fullness.     Musculoskeletal:         General: No tenderness.      Cervical back: Neck supple.   Lymphadenopathy:       Cervical: No cervical adenopathy.      Upper Body:      Right upper body: No supraclavicular, axillary or pectoral adenopathy.      Left upper body: No supraclavicular, axillary or pectoral adenopathy.   Neurological:      Mental Status: She is alert.         Assessment & Plan:   1. Encounter for gynecological examination without abnormal finding    2. Screening for malignant neoplasm of cervix    3. Screening mammogram for breast cancer    4. Chronic pelvic pain in female    5. Vaginal odor    6. Vaginal discharge        Orders Placed This Encounter   Procedures    Hpv Dna  High Risk , Thin Prep Collect    ThinPrep PAP Smear    THINPREP PAP SMEAR ONLY    Vaginitis Vaginosis PCR Panel     There certainly is no obvious gyne or other source for pain. It would be very rare for adhesions to cause pain years after the hysterectomy. I'll send message to Dr Castellon concerning this. He may have to still consider endoscopy. I discussed diagnostic laparoscopy with possible lysis of adhesions as a last resort. With normal imaging, a significant proportion of patients would have negative laparoscopy as well.     Meds This Visit:  Requested Prescriptions      No prescriptions requested or ordered in this encounter       Imaging & Referrals:  [facility] 2D+3D SCREENING BILAT (CPT=[id number])

## 2024-02-23 NOTE — TELEPHONE ENCOUNTER
Alek Uribe.   Please see my progress notes from exam today. I just don't believe there is an obvious gyne source for pain with the negative imaging. I assume you may feel similarly about GI disease. I did mention laparoscopy but a large proportion of patients would have negative findings there as well. Let me know if you do take her to colonoscopy. Thanks!  Russel

## 2024-02-25 ENCOUNTER — PATIENT MESSAGE (OUTPATIENT)
Dept: OBGYN CLINIC | Facility: CLINIC | Age: 59
End: 2024-02-25

## 2024-02-26 NOTE — TELEPHONE ENCOUNTER
From: Chayito Moses  To: Russel Clay  Sent: 2/25/2024 8:24 PM CST  Subject: Test results / questions    Thank u for letting me know about test results. And for reaching out to Dr. Judd. . This pain is definitely getting a little worse for me especially when I do activities . Please keep me posted on the best plan of action...  But on another note I still have that fishy smell going on when I use the bathroom.. even though results are negative. What should I do...    Chayito

## 2024-02-27 ENCOUNTER — TELEPHONE (OUTPATIENT)
Facility: CLINIC | Age: 59
End: 2024-02-27

## 2024-02-27 DIAGNOSIS — R10.9 ABDOMINAL PAIN, UNSPECIFIED ABDOMINAL LOCATION: Primary | ICD-10-CM

## 2024-02-27 DIAGNOSIS — Z12.11 COLON CANCER SCREENING: ICD-10-CM

## 2024-02-27 DIAGNOSIS — R19.4 CHANGE IN BOWEL HABITS: ICD-10-CM

## 2024-02-27 RX ORDER — PEG-3350, SODIUM SULFATE, SODIUM CHLORIDE, POTASSIUM CHLORIDE, SODIUM ASCORBATE AND ASCORBIC ACID 7.5-2.691G
KIT ORAL
Qty: 1 EACH | Refills: 0 | Status: SHIPPED | OUTPATIENT
Start: 2024-02-27

## 2024-02-27 NOTE — TELEPHONE ENCOUNTER
ANGELO THOMSON!!    Sorry to be slow to get back to you.    Yes, her symptoms do not have me all that concerned.  We have a reassuring CT scan abdomen and pelvis (up at [place]) on 12/11/2023.  No direct or indirect sign of anything concerning.    Her last colonoscopy was with [facility] on 5/26/2021 and reassuring essentially negative exam.    She had asked about endometriosis which I am sure she discussed with you.    Her pain is primarily pelvic so we will call and ask if she would like to repeat the colonoscopy exam.    - Maverick

## 2024-02-27 NOTE — TELEPHONE ENCOUNTER
GI schedulers, please call Ms. Moses to advise that after her visit with Dr. Clay, we could further evaluate her symptoms with a colonoscopy exam if she wishes.  As I had previously explained, another colonoscopy exam is optional at this point.  This is after the office visit with me and the recent CT scan mid December and then evaluation with Dr. Clay.    If she wishes to proceed:    GI schedulers -    Please schedule colonoscopy exam at [facility])    BMI Readings from Last 1 Encounters:   02/22/24 36.20 kg/m²     MAC anesthesia     BP Readings from Last 5 Encounters:   02/22/24 126/79   11/27/23 137/75       Moviprep small volume bowel prep if covered by insurance, otherwise   Golytely (PEG) 4L bowel prep  Rx sent in to [facility]      DX = lower abdominal pain, change in bowel habit, screening colonoscopy examination    Medication instructions:    Hold metformin day of procedure    Hold the following diabetic/weight loss medications for > 7 days prior to procedure:    GLP1:  Semaglutide (Ozempic®, Wegovy®, Rybelsus®)    Stop oral iron therapy (ferrous sulfate or ferrous gluconate) at least 10 days before procedure if possible.  Stop calcium therapy 5 days before procedure.

## 2024-02-28 NOTE — TELEPHONE ENCOUNTER
Scheduled for:  Colonoscopy [id number]   Provider Name:     Date:  08/08/2024  Location:  [facility]   Sedation:  Mac  Time:  1:30pm (pt is aware [facility] will call with arrival time     Prep:  Moviprep   Meds/Allergies Reconciled?:  Yes    Diagnosis with codes:   lower abdominal pain, R10.9 change in bowel habit,R19.4 screening colonoscopy examination Z12.11  Was patient informed to call insurance with codes (Y/N):  Yes     Referral sent?: Referral was sent at the time of electronic surgical scheduling.     EM or [facility] notified?:  I sent an electronic request to Endo Scheduling and received a confirmation today.       Medication Orders:  Hold metformin day of procedure     Hold the following diabetic/weight loss medications for > 7 days prior to procedure:     GLP1:  Semaglutide (Ozempic®, Wegovy®, Rybelsus®)     Stop oral iron therapy (ferrous sulfate or ferrous gluconate) at least 10 days before procedure if possible.  Stop calcium therapy 5 days before procedure.     Misc Orders:  None     Further instructions given by staff:  I discussed the prep instructions with the patient which she verbally understood and is aware that I will send the instructions today.via [employer]

## 2024-02-29 ENCOUNTER — APPOINTMENT (OUTPATIENT)
Dept: INTERNAL MEDICINE | Age: 59
End: 2024-02-29

## 2024-02-29 VITALS
SYSTOLIC BLOOD PRESSURE: 132 MMHG | WEIGHT: 221 LBS | HEART RATE: 81 BPM | OXYGEN SATURATION: 98 % | DIASTOLIC BLOOD PRESSURE: 78 MMHG | HEIGHT: 66 IN | BODY MASS INDEX: 35.52 KG/M2

## 2024-02-29 DIAGNOSIS — R10.30 LOWER ABDOMINAL PAIN: ICD-10-CM

## 2024-02-29 DIAGNOSIS — N89.8 VAGINAL ITCHING: ICD-10-CM

## 2024-02-29 DIAGNOSIS — I10 PRIMARY HYPERTENSION: ICD-10-CM

## 2024-02-29 DIAGNOSIS — G47.30 SLEEP APNEA, UNSPECIFIED TYPE: ICD-10-CM

## 2024-02-29 DIAGNOSIS — N39.41 URGE INCONTINENCE OF URINE: ICD-10-CM

## 2024-02-29 DIAGNOSIS — Z12.31 ENCOUNTER FOR SCREENING MAMMOGRAM FOR MALIGNANT NEOPLASM OF BREAST: ICD-10-CM

## 2024-02-29 DIAGNOSIS — R73.01 IMPAIRED FASTING GLUCOSE: ICD-10-CM

## 2024-02-29 DIAGNOSIS — E78.2 MIXED HYPERLIPIDEMIA: ICD-10-CM

## 2024-02-29 DIAGNOSIS — E55.9 VITAMIN D DEFICIENCY: ICD-10-CM

## 2024-02-29 DIAGNOSIS — Z00.00 ANNUAL PHYSICAL EXAM: ICD-10-CM

## 2024-02-29 DIAGNOSIS — R39.9 UTI SYMPTOMS: Primary | ICD-10-CM

## 2024-02-29 LAB
APPEARANCE UR: CLEAR
BILIRUB UR QL STRIP: NEGATIVE
COLOR UR: ABNORMAL
GLUCOSE UR STRIP-MCNC: NEGATIVE MG/DL
HGB UR QL STRIP: NEGATIVE
KETONES UR STRIP-MCNC: NEGATIVE MG/DL
LEUKOCYTE ESTERASE UR QL STRIP: NEGATIVE
NITRITE UR QL STRIP: NEGATIVE
PH UR STRIP: 6 [PH] (ref 5–7)
PROT UR STRIP-MCNC: ABNORMAL MG/DL
SP GR UR STRIP: 1.03 (ref 1–1.03)
UROBILINOGEN UR STRIP-MCNC: 0.2 MG/DL

## 2024-03-01 PROBLEM — R19.06 EPIGASTRIC MASS: Status: RESOLVED | Noted: 2023-09-14 | Resolved: 2024-03-01

## 2024-03-01 PROBLEM — R10.30 LOWER ABDOMINAL PAIN: Status: ACTIVE | Noted: 2024-03-01

## 2024-03-01 LAB
BACTERIAL VAGINOSIS VAG-IMP: NOT DETECTED
C ALBICANS DNA VAG QL NAA+PROBE: NOT DETECTED
C GLABRATA DNA VAG QL NAA+PROBE: NOT DETECTED
SERVICE CMNT-IMP: NORMAL
SERVICE CMNT-IMP: NORMAL
T VAGINALIS DNA VAG QL NAA+PROBE: NOT DETECTED

## 2024-03-01 ASSESSMENT — ENCOUNTER SYMPTOMS: ABDOMINAL PAIN: 1

## 2024-03-04 ENCOUNTER — E-ADVICE (OUTPATIENT)
Dept: INTERNAL MEDICINE | Age: 59
End: 2024-03-04

## 2024-03-04 ENCOUNTER — APPOINTMENT (OUTPATIENT)
Dept: INTERNAL MEDICINE | Age: 59
End: 2024-03-04

## 2024-03-05 ENCOUNTER — APPOINTMENT (OUTPATIENT)
Dept: INTERNAL MEDICINE | Age: 59
End: 2024-03-05

## 2024-03-12 ENCOUNTER — TELEPHONE (OUTPATIENT)
Facility: CLINIC | Age: 59
End: 2024-03-12

## 2024-03-12 NOTE — TELEPHONE ENCOUNTER
Informed patient  is still very booked patient understood and will continue to check to see if we have cancellations

## 2024-03-18 ENCOUNTER — EXTERNAL RECORD (OUTPATIENT)
Dept: HEALTH INFORMATION MANAGEMENT | Facility: OTHER | Age: 59
End: 2024-03-18

## 2024-03-21 PROCEDURE — 88305 TISSUE EXAM BY PATHOLOGIST: CPT | Performed by: INTERNAL MEDICINE

## 2024-03-27 ENCOUNTER — LAB SERVICES (OUTPATIENT)
Dept: LAB | Age: 59
End: 2024-03-27

## 2024-03-27 DIAGNOSIS — E55.9 VITAMIN D DEFICIENCY: ICD-10-CM

## 2024-03-27 DIAGNOSIS — R73.01 IMPAIRED FASTING GLUCOSE: ICD-10-CM

## 2024-03-27 DIAGNOSIS — I10 PRIMARY HYPERTENSION: ICD-10-CM

## 2024-03-27 DIAGNOSIS — E78.2 MIXED HYPERLIPIDEMIA: ICD-10-CM

## 2024-03-27 LAB
25(OH)D3+25(OH)D2 SERPL-MCNC: 25.3 NG/ML (ref 30–100)
ALBUMIN SERPL-MCNC: 4 G/DL (ref 3.6–5.1)
ALBUMIN/GLOB SERPL: 1.2 {RATIO} (ref 1–2.4)
ALP SERPL-CCNC: 75 UNITS/L (ref 45–117)
ALT SERPL-CCNC: 30 UNITS/L
ANION GAP SERPL CALC-SCNC: 6 MMOL/L (ref 7–19)
AST SERPL-CCNC: 18 UNITS/L
BASOPHILS # BLD: 0 K/MCL (ref 0–0.3)
BASOPHILS NFR BLD: 0 %
BILIRUB SERPL-MCNC: 0.3 MG/DL (ref 0.2–1)
BUN SERPL-MCNC: 12 MG/DL (ref 6–20)
BUN/CREAT SERPL: 17 (ref 7–25)
CALCIUM SERPL-MCNC: 9.3 MG/DL (ref 8.4–10.2)
CHLORIDE SERPL-SCNC: 105 MMOL/L (ref 97–110)
CHOLEST SERPL-MCNC: 198 MG/DL
CHOLEST/HDLC SERPL: 2.4 {RATIO}
CO2 SERPL-SCNC: 31 MMOL/L (ref 21–32)
CREAT SERPL-MCNC: 0.69 MG/DL (ref 0.51–0.95)
DEPRECATED RDW RBC: 39.7 FL (ref 39–50)
EGFRCR SERPLBLD CKD-EPI 2021: >90 ML/MIN/{1.73_M2}
EOSINOPHIL # BLD: 0.1 K/MCL (ref 0–0.5)
EOSINOPHIL NFR BLD: 2 %
ERYTHROCYTE [DISTWIDTH] IN BLOOD: 12.3 % (ref 11–15)
FASTING DURATION TIME PATIENT: 8 HOURS (ref 0–999)
GLOBULIN SER-MCNC: 3.4 G/DL (ref 2–4)
GLUCOSE SERPL-MCNC: 113 MG/DL (ref 70–99)
HBA1C MFR BLD: 5.5 % (ref 4.5–5.6)
HCT VFR BLD CALC: 38.6 % (ref 36–46.5)
HDLC SERPL-MCNC: 82 MG/DL
HGB BLD-MCNC: 13.5 G/DL (ref 12–15.5)
IMM GRANULOCYTES # BLD AUTO: 0 K/MCL (ref 0–0.2)
IMM GRANULOCYTES # BLD: 0 %
LDLC SERPL CALC-MCNC: 101 MG/DL
LYMPHOCYTES # BLD: 1.6 K/MCL (ref 1–4)
LYMPHOCYTES NFR BLD: 46 %
MCH RBC QN AUTO: 30.7 PG (ref 26–34)
MCHC RBC AUTO-ENTMCNC: 35 G/DL (ref 32–36.5)
MCV RBC AUTO: 87.7 FL (ref 78–100)
MONOCYTES # BLD: 0.3 K/MCL (ref 0.3–0.9)
MONOCYTES NFR BLD: 9 %
NEUTROPHILS # BLD: 1.5 K/MCL (ref 1.8–7.7)
NEUTROPHILS NFR BLD: 43 %
NONHDLC SERPL-MCNC: 116 MG/DL
NRBC BLD MANUAL-RTO: 0 /100 WBC
PLATELET # BLD AUTO: 316 K/MCL (ref 140–450)
POTASSIUM SERPL-SCNC: 3.9 MMOL/L (ref 3.4–5.1)
PROT SERPL-MCNC: 7.4 G/DL (ref 6.4–8.2)
RBC # BLD: 4.4 MIL/MCL (ref 4–5.2)
SODIUM SERPL-SCNC: 138 MMOL/L (ref 135–145)
TRIGL SERPL-MCNC: 73 MG/DL
TSH SERPL-ACNC: 0.87 MCUNITS/ML (ref 0.35–5)
WBC # BLD: 3.5 K/MCL (ref 4.2–11)

## 2024-03-27 PROCEDURE — 80061 LIPID PANEL: CPT | Performed by: CLINICAL MEDICAL LABORATORY

## 2024-03-27 PROCEDURE — 82306 VITAMIN D 25 HYDROXY: CPT | Performed by: CLINICAL MEDICAL LABORATORY

## 2024-03-27 PROCEDURE — 36415 COLL VENOUS BLD VENIPUNCTURE: CPT | Performed by: NURSE PRACTITIONER

## 2024-03-27 PROCEDURE — 83036 HEMOGLOBIN GLYCOSYLATED A1C: CPT | Performed by: CLINICAL MEDICAL LABORATORY

## 2024-03-27 PROCEDURE — 80050 GENERAL HEALTH PANEL: CPT | Performed by: CLINICAL MEDICAL LABORATORY

## 2024-03-28 DIAGNOSIS — D72.818 OTHER DECREASED WHITE BLOOD CELL COUNT: Primary | ICD-10-CM

## 2024-04-02 ENCOUNTER — E-ADVICE (OUTPATIENT)
Dept: INTERNAL MEDICINE | Age: 59
End: 2024-04-02

## 2024-04-03 ENCOUNTER — TELEPHONE (OUTPATIENT)
Dept: INTERNAL MEDICINE | Age: 59
End: 2024-04-03

## 2024-04-03 RX ORDER — MULTIVIT-MIN/IRON/FOLIC ACID/K 18-600-40
50 CAPSULE ORAL DAILY
Qty: 90 CAPSULE | Refills: 3 | Status: SHIPPED | OUTPATIENT
Start: 2024-04-03 | End: 2025-03-29

## 2024-04-22 ENCOUNTER — TELEPHONE (OUTPATIENT)
Facility: CLINIC | Age: 59
End: 2024-04-22

## 2024-04-22 NOTE — TELEPHONE ENCOUNTER
Results letter mailed to patient per   Colon recall entered for repeat in 5 yrs,3/21/2029  Colon done in 3/21/2024  HM Updated and Patient Outreach was placed for Colon recall   Maverick Castellon MD  P Em Gi Clinical Staff  GI RNs - 1. Please print and mail this letter to patient; 2. Recall for colonoscopy exam in 5 years   generally intact

## 2024-05-08 ENCOUNTER — OFFICE VISIT (OUTPATIENT)
Dept: OBGYN CLINIC | Facility: CLINIC | Age: 59
End: 2024-05-08

## 2024-05-08 VITALS
DIASTOLIC BLOOD PRESSURE: 77 MMHG | BODY MASS INDEX: 36 KG/M2 | SYSTOLIC BLOOD PRESSURE: 127 MMHG | HEART RATE: 94 BPM | WEIGHT: 225 LBS

## 2024-05-08 DIAGNOSIS — Z98.890 HISTORY OF MAJOR ABDOMINAL SURGERY: ICD-10-CM

## 2024-05-08 DIAGNOSIS — R10.2 PELVIC PAIN: Primary | ICD-10-CM

## 2024-05-08 PROCEDURE — 99213 OFFICE O/P EST LOW 20 MIN: CPT | Performed by: OBSTETRICS & GYNECOLOGY

## 2024-05-10 ENCOUNTER — E-ADVICE (OUTPATIENT)
Dept: INTERNAL MEDICINE | Age: 59
End: 2024-05-10

## 2024-05-10 ENCOUNTER — TELEPHONE (OUTPATIENT)
Dept: OBGYN CLINIC | Facility: CLINIC | Age: 59
End: 2024-05-10

## 2024-05-10 NOTE — TELEPHONE ENCOUNTER
Patient calling requesting her blood pressure from visit with Dr. Perez on 5/8/2024. Patient is admitted at [facility] currently, original went to the ER for confusion and was noted to have elevated blood pressure of 214/102. Patients RN is in the room with her at [facility] and indicated patients pressure on day of admission did decrease to 181/100 an hour after admission. RN states that pressures are back to normal limits, with a random elevation again last night. RN at [facility] states patient is admitted and being evaluated for \"Acute Confusion\"       Patient would like message routed to Dr. Perez. She is also asking what Dr. Perez's plan of care is after being seen on 5/8. Patient informed will route message and we will reach out once she has responded.    To Dr. Perez, please review. Thank you

## 2024-05-10 NOTE — TELEPHONE ENCOUNTER
Patient saw Dr Perez on Wednesday. Patient wants to know if they took her BP at the visit. Patient in the hospital at [facility] Accute Confusion.

## 2024-05-13 ENCOUNTER — PATIENT MESSAGE (OUTPATIENT)
Dept: GASTROENTEROLOGY | Facility: CLINIC | Age: 59
End: 2024-05-13

## 2024-05-13 NOTE — TELEPHONE ENCOUNTER
From: Chayito Moses  To: Maverick Castellon  Sent: 5/13/2024  8:26 AM CDT  Subject: Hospital patient    Good morning Dr. Castellon   I was in hospital on day i  was suppose to come and see you. I would like to reschedule that appointment or speak with you over video. I did go see Dr. Munoz about my lower stomach  pain. . I dont know if you want to speak with her then get back to me or call me  or you guys talk then consult me , either way I'm fine with it   I just want this pain to go away  Please let me katietanner know [phone]  Chayito Moses

## 2024-05-14 ENCOUNTER — TELEPHONE (OUTPATIENT)
Dept: INTERNAL MEDICINE | Age: 59
End: 2024-05-14

## 2024-05-15 ENCOUNTER — TELEPHONE (OUTPATIENT)
Dept: NEUROLOGY | Facility: CLINIC | Age: 59
End: 2024-05-15

## 2024-05-15 NOTE — TELEPHONE ENCOUNTER
Phone call returned to pt regarding an appointment request. Pt was seen at [facility] and was referred to Dr. Banks at [facility] due to her insurance being [email or web address]. Pt offered an appointment for 7/28/24, which was first available for a NPT to see Dr. Banks. RN explained that we will try to accommodate her sooner, RN able to provide pt a sooner appointment time on 6/28 as a NPT to DR. Banks and pt placed herself on the Wait list in case something sooner becomes available to her.

## 2024-05-15 NOTE — PROGRESS NOTES
Chayito Moses    1965       Chief Complaint   Patient presents with    Gyn Problem     ABDOMINAL PAIN     Patient c/o aching pain in her lower right abd.  Sometimes stops her from walking and always begins on the right but can radiate to the left lower abd.  Saw urogyne last year and was placed on meds for OAB which has greatly helped her with suprapubic discomfort.  She has hysterectomy 2 years ago due to symptomatic fibroids.  The pain is also aggravated by movement and intercourse.  Pain meds- meloxicam and taking tylenol 4-5 times per day is  not helping her.  BM's are every other day and we discussed managing constipation.  She states she is miserable and feels that she is getting worse.  We discussed adhesions could have developed that can cause some pain but that having repeat surgery only causes more scar tissue.  She had 3 previous abd surgeries-   section, myomectomy and then her supracervical hysterectomy. She wants to consider exploratory surgery/adhesiolysis.  We discussed that we could get her a surgical consult but that it would be their decision as to whether adhesiolysis could or should be attempted.  Discussed risks of injury to nearby organs with another surgery and that many diagnostic laparoscopies have no significant findings that explain the source of pain.  All questions answered.    Past Medical History:    High blood pressure    OAB (overactive bladder)    Pre-diabetes       Past Surgical History:   Procedure Laterality Date    Colonoscopy      [facility]    Colonoscopy N/A 2024    Procedure: COLONOSCOPY;  Surgeon: Maverick Castellon MD;  Location: [facility] MAIN OR      section level i      Myomectomy 5/> intramural myomas &/or total wt >250 gms, abdominal approach      Total abdominal hysterectomy          Pap Date: 24  Pap Result Notes: NEG PAP / NEG HPV      Current Outpatient Medications on File Prior to Visit   Medication Sig Dispense  Refill    PEG-KCl-NaCl-NaSulf-Na Asc-C (MOVIPREP) 100 g Oral Recon Soln Take as directed per instructions from Dr. Castellon's office. 1 each 0    losartan-hydroCHLOROthiazide 50-12.5 MG Oral Tab Take 1.5 tablets by mouth daily.      meclizine 25 MG Oral Tab Take 1 tablet (25 mg total) by mouth 3 (three) times daily as needed.      Meloxicam 15 MG Oral Tab Take 1 tablet (15 mg total) by mouth daily.      metFORMIN 500 MG Oral Tab Take 1 tablet (500 mg total) by mouth daily with breakfast. (Patient not taking: Reported on 2/22/2024)      Nystatin 317204 UNIT/GM External Powder Apply 1 Application topically 2 (two) times daily.      Omeprazole 40 MG Oral Capsule Delayed Release Take 1 capsule (40 mg total) by mouth daily.      rosuvastatin 10 MG Oral Tab Take 1 tablet (10 mg total) by mouth nightly.      semaglutide 8 MG/3ML Subcutaneous Solution Pen-injector Inject 2 mg into the skin once a week.      Cholecalciferol 50 MCG (2000 UT) Oral Tab Take 1 tablet (2,000 Units total) by mouth daily. (Patient not taking: Reported on 2/22/2024)       No current facility-administered medications on file prior to visit.       Birth control method: hysterectomy      PHYSICAL EXAM:       Constitutional: well developed, well nourished  Head/Face: normocephalic  Abdomen:  soft, +RLQ tenderness- no rebound or guarding, nondistended, no masses  Psychiatric:  Oriented to time, place, person and situation. Appropriate mood and affect    Pelvic Exam:  External Genitalia: normal appearance, hair distribution, and no lesions  Urethral Meatus:  normal in size, location, without lesions and prolapse  Bladder:  No fullness, masses or tenderness  Vagina:  Normal appearance without lesions, no abnormal discharge  Cervix:  normal appearance, no CMT  Adnexa: normal without masses or tenderness  Perineum: normal  Anus: no hemorhroids  Lymphatic: no palpable pelvic nodes     Chayito was seen today for gyn problem.    Diagnoses and all orders for this  visit:    Pelvic pain    History of major abdominal surgery    Plan:  will refer to robotic gen surgery for consult- patient understands that it is up to their discretion if surgery is indicated.

## 2024-05-15 NOTE — TELEPHONE ENCOUNTER
Patient provided with Dr. Garcia location and phone number.  Patient states she was discharged from ER with increased losartan and nortriptyline for migraines. She is feeling much better. She has appointment scheduled tomorrow for ER follow-up with PCP.     Patient asking if she needs follow-up with Dr. Perez?  If not, she is requesting that RN schedules her next annual for Feb 2025 with Dr. Perez for any Thurs or Friday. She states she does not need call back.     To Dr. Perez

## 2024-05-15 NOTE — TELEPHONE ENCOUNTER
Please inform Chayito that I would like for her to consult Dr Lyle Nava and please give her his contact information.  Also follow up with her regarding her emergency department visit for elevated BP's.  thanks

## 2024-05-16 ENCOUNTER — OFFICE VISIT (OUTPATIENT)
Dept: INTERNAL MEDICINE | Age: 59
End: 2024-05-16

## 2024-05-16 VITALS
DIASTOLIC BLOOD PRESSURE: 77 MMHG | BODY MASS INDEX: 35.68 KG/M2 | WEIGHT: 222 LBS | HEIGHT: 66 IN | OXYGEN SATURATION: 97 % | SYSTOLIC BLOOD PRESSURE: 116 MMHG | HEART RATE: 85 BPM

## 2024-05-16 DIAGNOSIS — Z09 HOSPITAL DISCHARGE FOLLOW-UP: ICD-10-CM

## 2024-05-16 DIAGNOSIS — G45.4 TGA (TRANSIENT GLOBAL AMNESIA): Primary | ICD-10-CM

## 2024-05-16 DIAGNOSIS — I10 PRIMARY HYPERTENSION: ICD-10-CM

## 2024-05-16 DIAGNOSIS — G43.709 CHRONIC MIGRAINE WITHOUT AURA WITHOUT STATUS MIGRAINOSUS, NOT INTRACTABLE: ICD-10-CM

## 2024-05-16 RX ORDER — LOSARTAN POTASSIUM AND HYDROCHLOROTHIAZIDE 12.5; 5 MG/1; MG/1
1 TABLET ORAL DAILY
Status: SHIPPED | COMMUNITY
Start: 2024-05-16

## 2024-05-16 RX ORDER — NORTRIPTYLINE HYDROCHLORIDE 10 MG/1
10 CAPSULE ORAL NIGHTLY
COMMUNITY
Start: 2024-05-12 | End: 2024-05-16

## 2024-05-16 RX ORDER — PROPRANOLOL HYDROCHLORIDE 80 MG/1
80 CAPSULE, EXTENDED RELEASE ORAL DAILY
Qty: 90 CAPSULE | Refills: 3 | Status: SHIPPED | OUTPATIENT
Start: 2024-05-16 | End: 2025-05-16

## 2024-05-16 ASSESSMENT — ENCOUNTER SYMPTOMS: HEADACHES: 1

## 2024-05-17 ENCOUNTER — TELEPHONE (OUTPATIENT)
Dept: INTERNAL MEDICINE | Age: 59
End: 2024-05-17

## 2024-05-17 ENCOUNTER — E-ADVICE (OUTPATIENT)
Dept: INTERNAL MEDICINE | Age: 59
End: 2024-05-17

## 2024-05-17 DIAGNOSIS — E66.9 OBESITY WITH BODY MASS INDEX 30 OR GREATER: Primary | ICD-10-CM

## 2024-05-17 RX ORDER — SEMAGLUTIDE 0.25 MG/.5ML
0.25 INJECTION, SOLUTION SUBCUTANEOUS
Qty: 2 ML | Refills: 1 | Status: SHIPPED | OUTPATIENT
Start: 2024-05-17

## 2024-05-18 ENCOUNTER — HOSPITAL ENCOUNTER (OUTPATIENT)
Dept: MAMMOGRAPHY | Age: 59
Discharge: HOME | End: 2024-05-18
Attending: OBSTETRICS & GYNECOLOGY

## 2024-05-18 DIAGNOSIS — Z12.31 SCREENING MAMMOGRAM FOR BREAST CANCER: ICD-10-CM

## 2024-05-18 LAB — HM MAMMOGRAPHY BILATERAL: NORMAL

## 2024-05-18 PROCEDURE — 77063 BREAST TOMOSYNTHESIS BI: CPT | Performed by: OBSTETRICS & GYNECOLOGY

## 2024-05-18 PROCEDURE — 77067 SCR MAMMO BI INCL CAD: CPT | Performed by: OBSTETRICS & GYNECOLOGY

## 2024-05-21 ENCOUNTER — EXTERNAL RECORD (OUTPATIENT)
Dept: HEALTH INFORMATION MANAGEMENT | Facility: OTHER | Age: 59
End: 2024-05-21

## 2024-05-29 ENCOUNTER — TELEPHONE (OUTPATIENT)
Dept: INTERNAL MEDICINE | Age: 59
End: 2024-05-29

## 2024-05-29 ENCOUNTER — APPOINTMENT (OUTPATIENT)
Dept: INTERNAL MEDICINE | Age: 59
End: 2024-05-29

## 2024-06-07 ENCOUNTER — E-ADVICE (OUTPATIENT)
Dept: INTERNAL MEDICINE | Age: 59
End: 2024-06-07

## 2024-07-02 ENCOUNTER — OFFICE VISIT (OUTPATIENT)
Dept: NEUROLOGY | Facility: CLINIC | Age: 59
End: 2024-07-02
Payer: COMMERCIAL

## 2024-07-02 VITALS
HEART RATE: 64 BPM | HEIGHT: 66 IN | RESPIRATION RATE: 16 BRPM | SYSTOLIC BLOOD PRESSURE: 134 MMHG | DIASTOLIC BLOOD PRESSURE: 83 MMHG | OXYGEN SATURATION: 99 % | BODY MASS INDEX: 36.16 KG/M2 | WEIGHT: 225 LBS

## 2024-07-02 DIAGNOSIS — G43.709 CHRONIC MIGRAINE W/O AURA W/O STATUS MIGRAINOSUS, NOT INTRACTABLE: ICD-10-CM

## 2024-07-02 DIAGNOSIS — G45.4 TRANSIENT GLOBAL AMNESIA: ICD-10-CM

## 2024-07-02 DIAGNOSIS — I67.4 HYPERTENSIVE ENCEPHALOPATHY: Primary | ICD-10-CM

## 2024-07-02 PROCEDURE — 99205 OFFICE O/P NEW HI 60 MIN: CPT | Performed by: INTERNAL MEDICINE

## 2024-07-02 NOTE — PROGRESS NOTES
[facility] [address], [address]  [facility] [address]  [phone]            Neurology Initial Visit     Referred By: Dr. Barrera ref. provider found    Chief Complaint:   Chief Complaint   Patient presents with    Neurologic Problem     Patient here today to establish care, patient was referred by Dr Elisa Ahmadi to treatr and evaluate for transglobal amnesia.        HPI:     Chayito Moses is a 59 year old female with history of hypertension, who presents for evaluation of episode of amnesia.  This happened in May.  She remembers waking up and going to the bathroom and that is the last thing she remembers.  That day, her children said that she was acting confused, very repetitive, repeating questions.  She remembered who her children were, but did not recognize anyone else.  In the hospital forgot who the ED doctor was when he walked in even though she had seen him before.  This is inability to form memories lasted for about a day and a half.  She stayed in the hospital for 3 days because when she started to come out of it she was having a very severe headache.  Also felt lightheaded.  Cannot recall any triggers related to exposure to extreme temperatures.  No vision changes during the episode.  Blood pressure was elevated to 200 systolic when she got to the ED.  She reported that blood pressure was high throughout the hospital stay and difficult to manage, but notes from hospitalization states that blood pressure improved during the admission to baseline levels.  It took 2 or 3 days for the headache to fully go away.    She has had episodic headaches for many years.  These are characterized by starting with a blurred vision sensation, followed by right-sided head pain which builds and escalates to a severe pain.  When it happens, she will usually go and lay down and this helps to improve the pain.  Taking several Tylenol will also help.  In the hospital, she was started  on nortriptyline for the headache, but her PCP switched this to propranolol about 5 days after discharge.  She has been taking the propranolol for around 6 weeks, and headaches have improved with this.  They are still happening about twice a week but are not as severe as before.    Past Medical History:    High blood pressure    OAB (overactive bladder)    Pre-diabetes       Past Surgical History:   Procedure Laterality Date    Colonoscopy      grace brothers    Colonoscopy N/A 2024    Procedure: COLONOSCOPY;  Surgeon: Maverick Castellon MD;  Location: [facility] MAIN OR    Hc  section level i      Myomectomy 5/> intramural myomas &/or total wt >250 gms, abdominal approach      Total abdominal hysterectomy         Social history:  History   Smoking Status    Former    Types: Cigarettes   Smokeless Tobacco    Never     History   Alcohol Use    Yes     Comment: socially     History   Drug Use Unknown       Family History   Problem Relation Age of Onset    Breast Cancer Maternal Aunt 50         Current Outpatient Medications:     PEG-KCl-NaCl-NaSulf-Na Asc-C (MOVIPREP) 100 g Oral Recon Soln, Take as directed per instructions from Dr. Castellon's office., Disp: 1 each, Rfl: 0    losartan-hydroCHLOROthiazide 50-12.5 MG Oral Tab, Take 1.5 tablets by mouth daily., Disp: , Rfl:     meclizine 25 MG Oral Tab, Take 1 tablet (25 mg total) by mouth 3 (three) times daily as needed., Disp: , Rfl:     Meloxicam 15 MG Oral Tab, Take 1 tablet (15 mg total) by mouth daily., Disp: , Rfl:     metFORMIN 500 MG Oral Tab, Take 1 tablet (500 mg total) by mouth daily with breakfast. (Patient not taking: Reported on 2024), Disp: , Rfl:     Nystatin 717158 UNIT/GM External Powder, Apply 1 Application topically 2 (two) times daily., Disp: , Rfl:     Omeprazole 40 MG Oral Capsule Delayed Release, Take 1 capsule (40 mg total) by mouth daily., Disp: , Rfl:     rosuvastatin 10 MG Oral Tab, Take 1 tablet (10 mg total) by mouth  nightly., Disp: , Rfl:     semaglutide 8 MG/3ML Subcutaneous Solution Pen-injector, Inject 2 mg into the skin once a week., Disp: , Rfl:     Cholecalciferol 50 MCG (2000 UT) Oral Tab, Take 1 tablet (2,000 Units total) by mouth daily. (Patient not taking: Reported on 2/22/2024), Disp: , Rfl:     Allergies   Allergen Reactions    Adhesive Tape RASH and OTHER (SEE COMMENTS)    Morphine ITCHING and OTHER (SEE COMMENTS)       ROS:   As in HPI, the rest of the 14 system review was done and was negative      Physical Exam:  Vitals:    07/02/24 1401   BP: 134/83   Pulse: 64   Resp: 16   SpO2: 99%   Weight: 225 lb (102.1 kg)   Height: 66\"       General: No apparent distress, well nourished, well groomed.  Head- Normocephalic, atraumatic  Eyes- No redness or swelling    Neurological:   Mental Status:  Mental Status- Alert, conversant, speech fluent, following all commands and Fund of knowledge appropriate for education and age    Cranial Nerves:  II.- Visual fields full to confrontation, III, IV, VI- EOM intact, PERRL, V. Facial sensation intact, and VII. Face symmetric, no facial weakness    Motor Exam:  Strength- upper extremities 5/5 proximally and distally                - lower  extremities 5/5 proximally and distally    Sensory Exam:  Pinprick- intact in all 4 extremities  Vibration- intact in all 4 extremities    Deep Tendon Reflexes:  Biceps 2+ bilateral symmetric  Triceps 2+ bilateral symmetric  Brachioradialis 2 + bilateral symmetric  Patellar 2+ bilateral symmetric  Ankle jerks 2+ bilateral symmetric    Coordination:  No dysmetria with finger to nose bilaterally    Gait:  Normal casual gait    Labs:  I have reviewed labs from OSH records    Imaging Studies:  I have independently reviewed imaging reports.  MRI brain reported as normal.  EEG 41-60 min also normal.        Assessment   Chayito Moses is a 59 year old female, who presents for evaluation of headaches and episode of amnesia in May 2024.  Episode of  amnesia is likely transient global amnesia.  Hypertensive encephalopathy could also present similarly, but the large period of time with amnesia would be more characteristic of TGA.  Also, does not seem that blood pressure was elevated persistently during the hospitalization, and imaging was negative.  No sign of PRESS was described on MRI.  There are reports of associated headache and dizziness during transient global amnesia, difficult to tell whether this was part of the TGA itself or her usual baseline migraines.      1. Transient global amnesia  -Discussed diagnosis and likelihood that this will not recur    2. Hypertensive encephalopathy  -Less likely that this caused the amnestic episode, but preventing uncontrolled BP will help to ensure that episodes like this never recur  -MRI and EEG were normal per report    3. Chronic migraine w/o aura w/o status migrainosus, not intractable  -Propranolol seems to be helping, continue current dose  -Add ibuprofen 600 mg at immediate onset of migraine for abortive       Education and counseling provided to patient. Instructed patient to call my office or seek medical attention immediately if symptoms worsen.  Patient verbalized understanding of information given. All questions were answered. All side effects of drugs were discussed.     Time spent for examination, counseling and coordination of care such as potential treatment options- 60 minutes with more than 50% of the time spent counseling the patient.    Return to clinic in: Return in about 3 months (around 10/2/2024).    Nazanin Wellington MD

## 2024-07-09 ENCOUNTER — APPOINTMENT (OUTPATIENT)
Dept: INTERNAL MEDICINE | Age: 59
End: 2024-07-09

## 2024-07-12 ENCOUNTER — HOSPITAL ENCOUNTER (OUTPATIENT)
Dept: GENERAL RADIOLOGY | Facility: HOSPITAL | Age: 59
Discharge: HOME | End: 2024-07-12
Attending: SURGERY
Payer: COMMERCIAL

## 2024-07-12 ENCOUNTER — APPOINTMENT (OUTPATIENT)
Dept: GENERAL RADIOLOGY | Facility: HOSPITAL | Age: 59
End: 2024-07-12
Attending: SURGERY
Payer: COMMERCIAL

## 2024-07-12 ENCOUNTER — APPOINTMENT (OUTPATIENT)
Dept: ULTRASOUND IMAGING | Facility: HOSPITAL | Age: 59
End: 2024-07-12
Attending: SURGERY
Payer: COMMERCIAL

## 2024-07-12 ENCOUNTER — HOSPITAL ENCOUNTER (OUTPATIENT)
Dept: ULTRASOUND IMAGING | Facility: HOSPITAL | Age: 59
Discharge: HOME | End: 2024-07-12
Attending: SURGERY
Payer: COMMERCIAL

## 2024-07-12 DIAGNOSIS — R10.30 LOWER ABDOMINAL PAIN: ICD-10-CM

## 2024-07-12 DIAGNOSIS — M54.10 RADICULAR PAIN: ICD-10-CM

## 2024-07-12 PROCEDURE — 76856 US EXAM PELVIC COMPLETE: CPT | Performed by: SURGERY

## 2024-07-12 PROCEDURE — 76830 TRANSVAGINAL US NON-OB: CPT | Performed by: SURGERY

## 2024-07-12 PROCEDURE — 74248 X-RAY SM INT F-THRU STD: CPT | Performed by: SURGERY

## 2024-07-12 PROCEDURE — 72114 X-RAY EXAM L-S SPINE BENDING: CPT | Performed by: SURGERY

## 2024-07-12 PROCEDURE — 74246 X-RAY XM UPR GI TRC 2CNTRST: CPT | Performed by: SURGERY

## 2024-07-18 ENCOUNTER — APPOINTMENT (OUTPATIENT)
Dept: INTERNAL MEDICINE | Age: 59
End: 2024-07-18

## 2024-07-18 VITALS
SYSTOLIC BLOOD PRESSURE: 132 MMHG | HEART RATE: 73 BPM | BODY MASS INDEX: 37.18 KG/M2 | DIASTOLIC BLOOD PRESSURE: 82 MMHG | WEIGHT: 231.37 LBS | OXYGEN SATURATION: 98 % | HEIGHT: 66 IN

## 2024-07-18 DIAGNOSIS — E66.9 OBESITY WITH BODY MASS INDEX 30 OR GREATER: ICD-10-CM

## 2024-07-18 DIAGNOSIS — M25.511 CHRONIC RIGHT SHOULDER PAIN: ICD-10-CM

## 2024-07-18 DIAGNOSIS — G89.29 CHRONIC PAIN OF BOTH KNEES: ICD-10-CM

## 2024-07-18 DIAGNOSIS — S46.112A PARTIAL DEGENERATIVE RUPTURE OF BICEPS TENDON, LEFT: ICD-10-CM

## 2024-07-18 DIAGNOSIS — E55.9 VITAMIN D DEFICIENCY: Primary | ICD-10-CM

## 2024-07-18 DIAGNOSIS — I10 PRIMARY HYPERTENSION: ICD-10-CM

## 2024-07-18 DIAGNOSIS — M25.561 CHRONIC PAIN OF BOTH KNEES: ICD-10-CM

## 2024-07-18 DIAGNOSIS — M25.562 CHRONIC PAIN OF BOTH KNEES: ICD-10-CM

## 2024-07-18 DIAGNOSIS — G89.29 CHRONIC RIGHT SHOULDER PAIN: ICD-10-CM

## 2024-07-18 DIAGNOSIS — N39.41 URGE INCONTINENCE OF URINE: ICD-10-CM

## 2024-07-18 DIAGNOSIS — G43.709 CHRONIC MIGRAINE WITHOUT AURA WITHOUT STATUS MIGRAINOSUS, NOT INTRACTABLE: ICD-10-CM

## 2024-07-18 DIAGNOSIS — G45.4 TGA (TRANSIENT GLOBAL AMNESIA): ICD-10-CM

## 2024-07-18 PROBLEM — R39.9 UTI SYMPTOMS: Status: RESOLVED | Noted: 2023-09-14 | Resolved: 2024-07-18

## 2024-07-18 PROCEDURE — 99214 OFFICE O/P EST MOD 30 MIN: CPT | Performed by: NURSE PRACTITIONER

## 2024-07-18 PROCEDURE — 3079F DIAST BP 80-89 MM HG: CPT | Performed by: NURSE PRACTITIONER

## 2024-07-18 PROCEDURE — 3075F SYST BP GE 130 - 139MM HG: CPT | Performed by: NURSE PRACTITIONER

## 2024-07-18 RX ORDER — NYSTATIN 100000 [USP'U]/G
1 POWDER TOPICAL 2 TIMES DAILY
Qty: 60 G | Refills: 1 | Status: SHIPPED | OUTPATIENT
Start: 2024-07-18

## 2024-07-18 RX ORDER — SEMAGLUTIDE 0.5 MG/.5ML
0.5 INJECTION, SOLUTION SUBCUTANEOUS
Qty: 2 ML | Refills: 0 | Status: SHIPPED | OUTPATIENT
Start: 2024-07-18

## 2024-07-18 RX ORDER — ERGOCALCIFEROL 1.25 MG/1
50000 CAPSULE ORAL
Qty: 12 CAPSULE | Refills: 0 | Status: SHIPPED | OUTPATIENT
Start: 2024-07-18

## 2024-07-18 RX ORDER — TROSPIUM CHLORIDE ER 60 MG/1
60 CAPSULE ORAL
Qty: 30 CAPSULE | Refills: 0 | Status: SHIPPED | OUTPATIENT
Start: 2024-07-18

## 2024-07-18 ASSESSMENT — PATIENT HEALTH QUESTIONNAIRE - PHQ9
2. FEELING DOWN, DEPRESSED OR HOPELESS: NOT AT ALL
CLINICAL INTERPRETATION OF PHQ2 SCORE: NO FURTHER SCREENING NEEDED
SUM OF ALL RESPONSES TO PHQ9 QUESTIONS 1 AND 2: 0
SUM OF ALL RESPONSES TO PHQ9 QUESTIONS 1 AND 2: 0
1. LITTLE INTEREST OR PLEASURE IN DOING THINGS: NOT AT ALL

## 2024-07-19 ENCOUNTER — E-ADVICE (OUTPATIENT)
Dept: INTERNAL MEDICINE | Age: 59
End: 2024-07-19

## 2024-08-02 ENCOUNTER — EXTERNAL RECORD (OUTPATIENT)
Dept: HEALTH INFORMATION MANAGEMENT | Facility: OTHER | Age: 59
End: 2024-08-02

## 2024-08-16 RX ORDER — TROSPIUM CHLORIDE ER 60 MG/1
60 CAPSULE ORAL
Qty: 30 CAPSULE | Refills: 0 | Status: SHIPPED | OUTPATIENT
Start: 2024-08-16

## 2024-08-16 RX ORDER — SEMAGLUTIDE 0.5 MG/.5ML
INJECTION, SOLUTION SUBCUTANEOUS
Qty: 2 ML | Refills: 0 | Status: SHIPPED | OUTPATIENT
Start: 2024-08-16

## 2024-09-04 DIAGNOSIS — E55.9 VITAMIN D DEFICIENCY: ICD-10-CM

## 2024-09-04 DIAGNOSIS — G43.709 CHRONIC MIGRAINE WITHOUT AURA WITHOUT STATUS MIGRAINOSUS, NOT INTRACTABLE: ICD-10-CM

## 2024-09-04 DIAGNOSIS — E66.9 OBESITY WITH BODY MASS INDEX 30 OR GREATER: Primary | ICD-10-CM

## 2024-09-04 RX ORDER — ROSUVASTATIN CALCIUM 10 MG/1
TABLET, COATED ORAL
Qty: 90 TABLET | Refills: 3 | Status: CANCELLED | OUTPATIENT
Start: 2024-09-04

## 2024-09-10 ENCOUNTER — TELEPHONE (OUTPATIENT)
Dept: INTERNAL MEDICINE | Age: 59
End: 2024-09-10

## 2024-09-10 RX ORDER — PROPRANOLOL HYDROCHLORIDE 80 MG/1
80 CAPSULE, EXTENDED RELEASE ORAL DAILY
Qty: 90 CAPSULE | Refills: 3 | Status: SHIPPED | OUTPATIENT
Start: 2024-09-10 | End: 2025-09-10

## 2024-09-10 RX ORDER — ADHESIVE BANDAGE 3/4"
BANDAGE TOPICAL
Qty: 1 EACH | Refills: 0 | Status: SHIPPED | OUTPATIENT
Start: 2024-09-10

## 2024-09-10 RX ORDER — ROSUVASTATIN CALCIUM 10 MG/1
10 TABLET, COATED ORAL DAILY
Qty: 90 TABLET | Refills: 3 | Status: SHIPPED | OUTPATIENT
Start: 2024-09-10

## 2024-09-10 RX ORDER — SEMAGLUTIDE 0.5 MG/.5ML
0.5 INJECTION, SOLUTION SUBCUTANEOUS ONCE
Qty: 2 ML | Refills: 0 | Status: SHIPPED | OUTPATIENT
Start: 2024-09-10 | End: 2024-09-10

## 2024-09-10 RX ORDER — ERGOCALCIFEROL 1.25 MG/1
50000 CAPSULE ORAL
Qty: 12 CAPSULE | Refills: 0 | Status: SHIPPED | OUTPATIENT
Start: 2024-09-10

## 2024-09-17 ENCOUNTER — TELEPHONE (OUTPATIENT)
Dept: INTERNAL MEDICINE | Age: 59
End: 2024-09-17

## 2024-09-17 DIAGNOSIS — E66.9 OBESITY WITH BODY MASS INDEX 30 OR GREATER: ICD-10-CM

## 2024-09-17 DIAGNOSIS — E66.9 OBESITY WITH BODY MASS INDEX 30 OR GREATER: Primary | ICD-10-CM

## 2024-09-17 RX ORDER — SEMAGLUTIDE 0.5 MG/.5ML
0.5 INJECTION, SOLUTION SUBCUTANEOUS ONCE
Qty: 2 ML | Refills: 0 | OUTPATIENT
Start: 2024-09-17 | End: 2024-09-17

## 2024-09-18 ENCOUNTER — TELEPHONE (OUTPATIENT)
Dept: OBGYN CLINIC | Facility: CLINIC | Age: 59
End: 2024-09-18

## 2024-09-18 NOTE — TELEPHONE ENCOUNTER
Received visit notes from ANNA Fletcher at [facility] dated 9/18/24.  Placed on Dr. Perez desdeb for review and sign-off.

## 2024-09-30 ENCOUNTER — E-ADVICE (OUTPATIENT)
Dept: INTERNAL MEDICINE | Age: 59
End: 2024-09-30

## 2024-09-30 DIAGNOSIS — E66.9 OBESITY WITH BODY MASS INDEX 30 OR GREATER: Primary | ICD-10-CM

## 2024-09-30 RX ORDER — SEMAGLUTIDE 1.7 MG/.75ML
1.7 INJECTION, SOLUTION SUBCUTANEOUS
Qty: 3 ML | Refills: 1 | Status: SHIPPED | OUTPATIENT
Start: 2024-09-30

## 2024-10-04 ENCOUNTER — E-ADVICE (OUTPATIENT)
Dept: INTERNAL MEDICINE | Age: 59
End: 2024-10-04

## 2024-10-04 DIAGNOSIS — M25.519 ARTHRALGIA OF SHOULDER, UNSPECIFIED LATERALITY: Primary | ICD-10-CM

## 2025-02-06 ENCOUNTER — OFFICE VISIT (OUTPATIENT)
Dept: OBGYN CLINIC | Facility: CLINIC | Age: 60
End: 2025-02-06
Payer: COMMERCIAL

## 2025-02-06 VITALS
HEART RATE: 71 BPM | DIASTOLIC BLOOD PRESSURE: 83 MMHG | BODY MASS INDEX: 37 KG/M2 | SYSTOLIC BLOOD PRESSURE: 132 MMHG | WEIGHT: 230.81 LBS

## 2025-02-06 DIAGNOSIS — R10.2 PELVIC PAIN: ICD-10-CM

## 2025-02-06 DIAGNOSIS — Z01.411 ENCOUNTER FOR GYNECOLOGICAL EXAMINATION WITH ABNORMAL FINDING: Primary | ICD-10-CM

## 2025-02-06 DIAGNOSIS — Z12.31 VISIT FOR SCREENING MAMMOGRAM: ICD-10-CM

## 2025-02-06 DIAGNOSIS — R32 URINARY INCONTINENCE, UNSPECIFIED TYPE: ICD-10-CM

## 2025-02-06 DIAGNOSIS — R15.9 INCONTINENCE OF FECES, UNSPECIFIED FECAL INCONTINENCE TYPE: ICD-10-CM

## 2025-02-06 PROCEDURE — 99396 PREV VISIT EST AGE 40-64: CPT | Performed by: OBSTETRICS & GYNECOLOGY

## 2025-02-06 NOTE — PROGRESS NOTES
Chayito Moses is a 59 year old female  No LMP recorded. Patient has had a hysterectomy. who presents for   Chief Complaint   Patient presents with    Gyn Exam     ANNUAL EXAM Reviewed Preventative/Wellness form with patient.      Patient expressed frustration about no cause found for her lower pelvic cramping mostly on the right- she manages the discomfort with tylenol.  I reviewed her pelvic ultrasound from last July and her uterus is surgically absent and her ovaries are normal.  She also states that she sometimes passes some stool when she passes gas and she is already taking meds for urinary incontinence.  I suggested pelvic floor patient to her since all of these complaints could be associated with pelvic floor dysfunction but she declines.  She states that she went to one phys therapy session and did not return because \" they wanted to put something in there\".  I urged her to reconsider since no other source for her pain has been found.      OBSTETRICS HISTORY:  OB History    Para Term  AB Living   4 3 3 0 1 3   SAB IAB Ectopic Multiple Live Births   1 0 0 0 3       GYNE HISTORY:        MEDICAL HISTORY:  Past Medical History:    High blood pressure    OAB (overactive bladder)    Pre-diabetes       SURGICAL HISTORY:  Past Surgical History:   Procedure Laterality Date    Colonoscopy      grace nagy    Colonoscopy N/A 2024    Procedure: COLONOSCOPY;  Surgeon: Maverick Castellon MD;  Location: [facility] MAIN OR      section level i      Myomectomy 5/> intramural myomas &/or total wt >250 gms, abdominal approach      Total abdominal hysterectomy         SOCIAL HISTORY:  Social History     Socioeconomic History    Marital status: Unknown   Tobacco Use    Smoking status: Former     Types: Cigarettes    Smokeless tobacco: Never   Vaping Use    Vaping status: Never Used   Substance and Sexual Activity    Alcohol use: Yes     Comment: socially    Drug use: Never          FAMILY HISTORY:  Family History   Problem Relation Age of Onset    Breast Cancer Maternal Aunt 50       MEDICATIONS:    Current Outpatient Medications:     PEG-KCl-NaCl-NaSulf-Na Asc-C (MOVIPREP) 100 g Oral Recon Soln, Take as directed per instructions from Dr. Castellon's office., Disp: 1 each, Rfl: 0    losartan-hydroCHLOROthiazide 50-12.5 MG Oral Tab, Take 1.5 tablets by mouth daily., Disp: , Rfl:     meclizine 25 MG Oral Tab, Take 1 tablet (25 mg total) by mouth 3 (three) times daily as needed., Disp: , Rfl:     Meloxicam 15 MG Oral Tab, Take 1 tablet (15 mg total) by mouth daily., Disp: , Rfl:     Nystatin 479375 UNIT/GM External Powder, Apply 1 Application topically 2 (two) times daily., Disp: , Rfl:     Omeprazole 40 MG Oral Capsule Delayed Release, Take 1 capsule (40 mg total) by mouth daily., Disp: , Rfl:     rosuvastatin 10 MG Oral Tab, Take 1 tablet (10 mg total) by mouth nightly., Disp: , Rfl:     semaglutide 8 MG/3ML Subcutaneous Solution Pen-injector, Inject 2 mg into the skin once a week., Disp: , Rfl:     metFORMIN 500 MG Oral Tab, Take 1 tablet (500 mg total) by mouth daily with breakfast. (Patient not taking: Reported on 2/6/2025), Disp: , Rfl:     Cholecalciferol 50 MCG (2000 UT) Oral Tab, Take 1 tablet (2,000 Units total) by mouth daily. (Patient not taking: Reported on 2/6/2025), Disp: , Rfl:     ALLERGIES:  Allergies[1]      Review of Systems:  Constitutional:  Denies fatigue, night sweats, hot flashes  Eyes:  denies blurred or double vision  Cardiovascular:  denies chest pain or palpitations  Respiratory:  denies shortness of breath  Gastrointestinal:  denies heartburn, abdominal pain, diarrhea or constipation  Genitourinary:  denies dysuria, incontinence, abnormal vaginal discharge, vaginal itching  Musculoskeletal:  denies back pain.  Skin/Breast:  Denies any breast pain, lumps, or discharge.   Neurological:  denies headaches, extremity weakness or numbness.  Psychiatric: denies  depression or anxiety.  Endocrine:   denies excessive thirst or urination.  Heme/Lymph:  denies history of anemia, easy bruising or bleeding.    Depression Screening (PHQ-2/PHQ-9): Over the LAST 2 WEEKS   Little interest or pleasure in doing things (over the last two weeks)?: Not at all    Feeling down, depressed, or hopeless (over the last two weeks)?: Not at all    PHQ-2 SCORE: 0         Blood pressure 132/83, pulse 71, weight 230 lb 12.8 oz (104.7 kg).    PHYSICAL EXAM:   Constitutional: well developed, well nourished  Head/Face: normocephalic  Neck/Thyroid: thyroid symmetric, no thyromegaly, no nodules, no adenopathy  Lymphatic:no abnormal supraclavicular or axillary adenopathy is noted  Breast: normal without palpable masses, tenderness, asymmetry, nipple discharge, nipple retraction or skin changes  Respiratory:  lungs clear to auscultation bilaterally  Cardiovascular: regular rate and rhythm, no significant murmur  Abdomen:  soft, nontender, nondistended, no masses  Skin/Hair: no unusual rashes or bruises  Extremities: no edema, no cyanosis  Psychiatric:  Oriented to time, place, person and situation. Appropriate mood and affect    Pelvic Exam:  External Genitalia: normal appearance, hair distribution, and no lesions  Urethral Meatus:  normal in size, location, without lesions and prolapse  Bladder:  No fullness, masses or tenderness  Vagina:  Normal appearance without lesions, no abnormal discharge  Cervix:  Normal without tenderness on motion  Uterus: surgically absent  Adnexa: normal without masses or tenderness  Perineum: normal  Rectovaginal: no masses, normal tone  Anus: no thrombosed or ulcerated hemorroids    Assessment & Plan:   ASCCP guidelines discussed,cotest done [id number] in 3 years ,mammogram ordered,rtc 1 year for annual exam   SBE encouraged  Encounter Diagnoses   Name Primary?    Encounter for gynecological examination with abnormal finding Yes    Incontinence of feces,  unspecified fecal incontinence type     Urinary incontinence, unspecified type     Pelvic pain     Visit for screening mammogram      No orders of the defined types were placed in this encounter.    Requested Prescriptions      No prescriptions requested or ordered in this encounter     [facility] 2D+3D SCREENING BILAT (CPT=77067/75632)                [1]   Allergies  Allergen Reactions    Adhesive Tape RASH and OTHER (SEE COMMENTS)    Morphine ITCHING and OTHER (SEE COMMENTS)

## 2025-03-04 ENCOUNTER — E-ADVICE (OUTPATIENT)
Dept: INTERNAL MEDICINE | Age: 60
End: 2025-03-04

## 2025-03-05 ENCOUNTER — OFFICE VISIT (OUTPATIENT)
Dept: INTERNAL MEDICINE | Age: 60
End: 2025-03-05

## 2025-03-05 VITALS
WEIGHT: 232 LBS | BODY MASS INDEX: 37.28 KG/M2 | SYSTOLIC BLOOD PRESSURE: 125 MMHG | HEART RATE: 81 BPM | DIASTOLIC BLOOD PRESSURE: 83 MMHG | HEIGHT: 66 IN | OXYGEN SATURATION: 95 %

## 2025-03-05 DIAGNOSIS — G47.30 SLEEP APNEA, UNSPECIFIED TYPE: ICD-10-CM

## 2025-03-05 DIAGNOSIS — R05.2 SUBACUTE COUGH: ICD-10-CM

## 2025-03-05 DIAGNOSIS — J31.0 OTHER RHINITIS: ICD-10-CM

## 2025-03-05 DIAGNOSIS — E66.9 OBESITY WITH BODY MASS INDEX 30 OR GREATER: ICD-10-CM

## 2025-03-05 DIAGNOSIS — R22.1 MASS OF RIGHT SIDE OF NECK: Primary | ICD-10-CM

## 2025-03-05 PROCEDURE — 3074F SYST BP LT 130 MM HG: CPT | Performed by: NURSE PRACTITIONER

## 2025-03-05 PROCEDURE — 99214 OFFICE O/P EST MOD 30 MIN: CPT | Performed by: NURSE PRACTITIONER

## 2025-03-05 PROCEDURE — 3079F DIAST BP 80-89 MM HG: CPT | Performed by: NURSE PRACTITIONER

## 2025-03-05 RX ORDER — TIRZEPATIDE 2.5 MG/.5ML
2.5 INJECTION, SOLUTION SUBCUTANEOUS
Qty: 2 ML | Refills: 0 | Status: SHIPPED | OUTPATIENT
Start: 2025-03-05

## 2025-03-05 RX ORDER — FLUTICASONE PROPIONATE 50 MCG
2 SPRAY, SUSPENSION (ML) NASAL DAILY
Qty: 16 G | Refills: 1 | Status: SHIPPED | OUTPATIENT
Start: 2025-03-05

## 2025-03-05 RX ORDER — FLUTICASONE PROPIONATE AND SALMETEROL 250; 50 UG/1; UG/1
1 POWDER RESPIRATORY (INHALATION)
Qty: 60 EACH | Refills: 1 | Status: SHIPPED | OUTPATIENT
Start: 2025-03-05

## 2025-03-05 ASSESSMENT — ENCOUNTER SYMPTOMS
SHORTNESS OF BREATH: 1
COUGH: 1

## 2025-03-13 ENCOUNTER — APPOINTMENT (OUTPATIENT)
Dept: INTERNAL MEDICINE | Age: 60
End: 2025-03-13

## 2025-03-14 ENCOUNTER — TELEPHONE (OUTPATIENT)
Dept: INTERNAL MEDICINE | Age: 60
End: 2025-03-14

## 2025-03-18 ENCOUNTER — TELEPHONE (OUTPATIENT)
Dept: INTERNAL MEDICINE | Age: 60
End: 2025-03-18

## 2025-04-02 ENCOUNTER — APPOINTMENT (OUTPATIENT)
Dept: INTERNAL MEDICINE | Age: 60
End: 2025-04-02

## 2025-04-09 ENCOUNTER — E-ADVICE (OUTPATIENT)
Dept: INTERNAL MEDICINE | Age: 60
End: 2025-04-09

## 2025-04-09 DIAGNOSIS — G43.709 CHRONIC MIGRAINE WITHOUT AURA WITHOUT STATUS MIGRAINOSUS, NOT INTRACTABLE: ICD-10-CM

## 2025-04-09 DIAGNOSIS — E55.9 VITAMIN D DEFICIENCY: ICD-10-CM

## 2025-04-09 RX ORDER — LOSARTAN POTASSIUM AND HYDROCHLOROTHIAZIDE 12.5; 5 MG/1; MG/1
1 TABLET ORAL DAILY
Qty: 90 TABLET | Refills: 3 | Status: SHIPPED | OUTPATIENT
Start: 2025-04-09

## 2025-04-14 RX ORDER — FLUCONAZOLE 150 MG/1
TABLET ORAL
Qty: 2 TABLET | Refills: 0 | Status: SHIPPED | OUTPATIENT
Start: 2025-04-14

## 2025-04-14 RX ORDER — PROPRANOLOL HYDROCHLORIDE 80 MG/1
80 CAPSULE, EXTENDED RELEASE ORAL DAILY
Qty: 90 CAPSULE | Refills: 0 | Status: SHIPPED | OUTPATIENT
Start: 2025-04-14 | End: 2026-04-14

## 2025-04-14 RX ORDER — ERGOCALCIFEROL 1.25 MG/1
50000 CAPSULE ORAL
Qty: 12 CAPSULE | Refills: 0 | Status: SHIPPED | OUTPATIENT
Start: 2025-04-14

## 2025-04-14 RX ORDER — ROSUVASTATIN CALCIUM 10 MG/1
10 TABLET, COATED ORAL DAILY
Qty: 90 TABLET | Refills: 0 | Status: SHIPPED | OUTPATIENT
Start: 2025-04-14

## 2025-04-30 ENCOUNTER — APPOINTMENT (OUTPATIENT)
Dept: INTERNAL MEDICINE | Age: 60
End: 2025-04-30

## 2025-05-23 ENCOUNTER — TELEPHONE (OUTPATIENT)
Dept: PODIATRY CLINIC | Facility: CLINIC | Age: 60
End: 2025-05-23

## 2025-05-23 DIAGNOSIS — M79.671 BILATERAL FOOT PAIN: Primary | ICD-10-CM

## 2025-05-23 DIAGNOSIS — M79.672 BILATERAL FOOT PAIN: Primary | ICD-10-CM

## 2025-07-01 ENCOUNTER — TELEPHONE (OUTPATIENT)
Dept: PODIATRY CLINIC | Facility: CLINIC | Age: 60
End: 2025-07-01

## 2025-07-02 DIAGNOSIS — E55.9 VITAMIN D DEFICIENCY: ICD-10-CM

## 2025-07-02 RX ORDER — ROSUVASTATIN CALCIUM 10 MG/1
10 TABLET, COATED ORAL DAILY
Qty: 90 TABLET | Refills: 0 | Status: SHIPPED | OUTPATIENT
Start: 2025-07-02

## 2025-07-02 RX ORDER — ERGOCALCIFEROL 1.25 MG/1
1.25 CAPSULE, LIQUID FILLED ORAL
Qty: 12 CAPSULE | Refills: 0 | Status: SHIPPED | OUTPATIENT
Start: 2025-07-02

## 2025-07-16 ENCOUNTER — TELEPHONE (OUTPATIENT)
Dept: INTERNAL MEDICINE | Age: 60
End: 2025-07-16

## 2025-07-16 DIAGNOSIS — E66.9 OBESITY WITH BODY MASS INDEX 30 OR GREATER: ICD-10-CM

## 2025-07-16 DIAGNOSIS — E55.9 VITAMIN D DEFICIENCY: ICD-10-CM

## 2025-07-16 DIAGNOSIS — G47.30 SLEEP APNEA, UNSPECIFIED TYPE: ICD-10-CM

## 2025-07-16 DIAGNOSIS — G43.709 CHRONIC MIGRAINE WITHOUT AURA WITHOUT STATUS MIGRAINOSUS, NOT INTRACTABLE: ICD-10-CM

## 2025-07-16 RX ORDER — LOSARTAN POTASSIUM AND HYDROCHLOROTHIAZIDE 12.5; 5 MG/1; MG/1
1 TABLET ORAL DAILY
Qty: 90 TABLET | Refills: 0 | Status: SHIPPED | OUTPATIENT
Start: 2025-07-16

## 2025-07-16 RX ORDER — PROPRANOLOL HYDROCHLORIDE 80 MG/1
80 CAPSULE, EXTENDED RELEASE ORAL DAILY
Qty: 90 CAPSULE | Refills: 0 | Status: SHIPPED | OUTPATIENT
Start: 2025-07-16 | End: 2026-07-16

## 2025-07-16 RX ORDER — ROSUVASTATIN CALCIUM 10 MG/1
10 TABLET, COATED ORAL DAILY
Qty: 90 TABLET | Refills: 0 | OUTPATIENT
Start: 2025-07-16

## 2025-07-16 RX ORDER — TIRZEPATIDE 2.5 MG/.5ML
2.5 INJECTION, SOLUTION SUBCUTANEOUS
Qty: 2 ML | Refills: 0 | Status: SHIPPED | OUTPATIENT
Start: 2025-07-16

## 2025-07-16 RX ORDER — ERGOCALCIFEROL 1.25 MG/1
1.25 CAPSULE, LIQUID FILLED ORAL
Qty: 12 CAPSULE | Refills: 0 | OUTPATIENT
Start: 2025-07-16

## 2025-07-31 ENCOUNTER — HOSPITAL ENCOUNTER (OUTPATIENT)
Dept: GENERAL RADIOLOGY | Age: 60
Discharge: HOME | End: 2025-07-31
Attending: PODIATRIST

## 2025-07-31 ENCOUNTER — OFFICE VISIT (OUTPATIENT)
Dept: PODIATRY CLINIC | Facility: CLINIC | Age: 60
End: 2025-07-31

## 2025-07-31 DIAGNOSIS — M79.672 BILATERAL FOOT PAIN: ICD-10-CM

## 2025-07-31 DIAGNOSIS — M79.671 BILATERAL FOOT PAIN: ICD-10-CM

## 2025-07-31 DIAGNOSIS — M76.829 PTTD (POSTERIOR TIBIAL TENDON DYSFUNCTION): Primary | ICD-10-CM

## 2025-07-31 PROCEDURE — 73620 X-RAY EXAM OF FOOT: CPT | Performed by: PODIATRIST

## 2025-07-31 PROCEDURE — 99204 OFFICE O/P NEW MOD 45 MIN: CPT | Performed by: PODIATRIST

## 2025-08-20 ENCOUNTER — APPOINTMENT (OUTPATIENT)
Dept: INTERNAL MEDICINE | Age: 60
End: 2025-08-20

## 2025-08-20 VITALS
WEIGHT: 237.32 LBS | BODY MASS INDEX: 38.14 KG/M2 | DIASTOLIC BLOOD PRESSURE: 74 MMHG | HEART RATE: 73 BPM | HEIGHT: 66 IN | OXYGEN SATURATION: 99 % | SYSTOLIC BLOOD PRESSURE: 115 MMHG

## 2025-08-20 DIAGNOSIS — I10 PRIMARY HYPERTENSION: ICD-10-CM

## 2025-08-20 DIAGNOSIS — N89.8 VAGINAL ODOR: ICD-10-CM

## 2025-08-20 DIAGNOSIS — Z23 NEED FOR TDAP VACCINATION: ICD-10-CM

## 2025-08-20 DIAGNOSIS — Z78.0 MENOPAUSE: ICD-10-CM

## 2025-08-20 DIAGNOSIS — G47.30 SLEEP APNEA, UNSPECIFIED TYPE: ICD-10-CM

## 2025-08-20 DIAGNOSIS — Z23 NEED FOR PNEUMOCOCCAL VACCINE: ICD-10-CM

## 2025-08-20 DIAGNOSIS — G45.4 TGA (TRANSIENT GLOBAL AMNESIA): ICD-10-CM

## 2025-08-20 DIAGNOSIS — E66.9 OBESITY WITH BODY MASS INDEX 30 OR GREATER: ICD-10-CM

## 2025-08-20 DIAGNOSIS — N39.41 URGE INCONTINENCE OF URINE: ICD-10-CM

## 2025-08-20 DIAGNOSIS — R73.01 IMPAIRED FASTING GLUCOSE: ICD-10-CM

## 2025-08-20 DIAGNOSIS — E55.9 VITAMIN D DEFICIENCY: ICD-10-CM

## 2025-08-20 DIAGNOSIS — Z00.00 ANNUAL PHYSICAL EXAM: Primary | ICD-10-CM

## 2025-08-20 DIAGNOSIS — E78.2 MIXED HYPERLIPIDEMIA: ICD-10-CM

## 2025-08-20 DIAGNOSIS — G43.709 CHRONIC MIGRAINE WITHOUT AURA WITHOUT STATUS MIGRAINOSUS, NOT INTRACTABLE: ICD-10-CM

## 2025-08-20 PROBLEM — Z12.31 ENCOUNTER FOR SCREENING MAMMOGRAM FOR MALIGNANT NEOPLASM OF BREAST: Status: RESOLVED | Noted: 2022-11-10 | Resolved: 2025-08-20

## 2025-08-20 PROBLEM — R22.1 MASS OF RIGHT SIDE OF NECK: Status: RESOLVED | Noted: 2025-03-05 | Resolved: 2025-08-20

## 2025-08-20 PROBLEM — R39.15 URINARY URGENCY: Status: ACTIVE | Noted: 2025-08-20

## 2025-08-20 PROBLEM — Z09 HOSPITAL DISCHARGE FOLLOW-UP: Status: RESOLVED | Noted: 2024-05-16 | Resolved: 2025-08-20

## 2025-08-20 PROCEDURE — 82570 ASSAY OF URINE CREATININE: CPT | Performed by: CLINICAL MEDICAL LABORATORY

## 2025-08-20 PROCEDURE — 81003 URINALYSIS AUTO W/O SCOPE: CPT | Performed by: CLINICAL MEDICAL LABORATORY

## 2025-08-20 PROCEDURE — 82043 UR ALBUMIN QUANTITATIVE: CPT | Performed by: CLINICAL MEDICAL LABORATORY

## 2025-08-20 RX ORDER — HYDROCHLOROTHIAZIDE 12.5 MG/1
12.5 TABLET ORAL DAILY
Qty: 90 TABLET | Refills: 3 | Status: SHIPPED | OUTPATIENT
Start: 2025-08-20

## 2025-08-20 RX ORDER — PROPRANOLOL HYDROCHLORIDE 80 MG/1
80 CAPSULE, EXTENDED RELEASE ORAL DAILY
Qty: 90 CAPSULE | Refills: 3 | Status: SHIPPED | OUTPATIENT
Start: 2025-08-20 | End: 2026-08-20

## 2025-08-20 RX ORDER — METRONIDAZOLE 500 MG/1
500 TABLET ORAL 2 TIMES DAILY
Qty: 14 TABLET | Refills: 0 | Status: SHIPPED | OUTPATIENT
Start: 2025-08-20 | End: 2025-08-27

## 2025-08-20 RX ORDER — TIRZEPATIDE 2.5 MG/.5ML
2.5 INJECTION, SOLUTION SUBCUTANEOUS
Qty: 2 ML | Refills: 0 | Status: SHIPPED | OUTPATIENT
Start: 2025-08-20

## 2025-08-20 RX ORDER — AMLODIPINE BESYLATE 5 MG/1
5 TABLET ORAL DAILY
Qty: 90 TABLET | Refills: 3 | Status: SHIPPED | OUTPATIENT
Start: 2025-08-20

## 2025-08-20 RX ORDER — ROSUVASTATIN CALCIUM 10 MG/1
10 TABLET, COATED ORAL DAILY
Qty: 90 TABLET | Refills: 3 | Status: SHIPPED | OUTPATIENT
Start: 2025-08-20

## 2025-08-20 RX ORDER — FLUCONAZOLE 150 MG/1
TABLET ORAL
Qty: 2 TABLET | Refills: 0 | Status: SHIPPED | OUTPATIENT
Start: 2025-08-20

## 2025-08-20 RX ORDER — MIRABEGRON 50 MG/1
50 TABLET, FILM COATED, EXTENDED RELEASE ORAL DAILY
Qty: 90 TABLET | Refills: 3 | Status: SHIPPED | OUTPATIENT
Start: 2025-08-20

## 2025-08-20 SDOH — HEALTH STABILITY: MENTAL HEALTH: HOW MANY STANDARD DRINKS CONTAINING ALCOHOL DO YOU HAVE ON A TYPICAL DAY?: 3 OR 4

## 2025-08-20 SDOH — HEALTH STABILITY: MENTAL HEALTH: HOW OFTEN DO YOU HAVE 6 OR MORE DRINKS ON ONE OCCASION?: LESS THAN MONTHLY

## 2025-08-20 SDOH — HEALTH STABILITY: MENTAL HEALTH: AUDIT TOTAL SCORE: 3

## 2025-08-20 SDOH — HEALTH STABILITY: MENTAL HEALTH: HOW OFTEN DO YOU HAVE A DRINK CONTAINING ALCOHOL?: MONTHLY OR LESS

## 2025-08-20 ASSESSMENT — PATIENT HEALTH QUESTIONNAIRE - PHQ9
SUM OF ALL RESPONSES TO PHQ9 QUESTIONS 1 AND 2: 0
CLINICAL INTERPRETATION OF PHQ2 SCORE: NO FURTHER SCREENING NEEDED
1. LITTLE INTEREST OR PLEASURE IN DOING THINGS: NOT AT ALL
SUM OF ALL RESPONSES TO PHQ9 QUESTIONS 1 AND 2: 0
2. FEELING DOWN, DEPRESSED OR HOPELESS: NOT AT ALL

## 2025-08-21 LAB
APPEARANCE UR: CLEAR
BILIRUB UR QL STRIP: NEGATIVE
COLOR UR: NORMAL
CREAT UR-MCNC: 88.5 MG/DL
GLUCOSE UR STRIP-MCNC: NEGATIVE MG/DL
HGB UR QL STRIP: NEGATIVE
KETONES UR STRIP-MCNC: NEGATIVE MG/DL
LEUKOCYTE ESTERASE UR QL STRIP: NEGATIVE
MICROALBUMIN UR-MCNC: 1.73 MG/DL
MICROALBUMIN/CREAT UR: 19.5 MG/G
NITRITE UR QL STRIP: NEGATIVE
PH UR STRIP: 5.5 [PH] (ref 5–7)
PROT UR STRIP-MCNC: NEGATIVE MG/DL
SP GR UR STRIP: 1.02 (ref 1–1.03)
UROBILINOGEN UR STRIP-MCNC: 0.2 MG/DL

## (undated) NOTE — MR AVS SNAPSHOT
After Visit Summary   2/22/2024    Chayito Moses   MRN: [id number]           Visit Information     Date & Time  2/22/2024  2:40 PM Provider  Russel Clay DO Department [facility], [place] - OB/GYN Dept. Phone  [phone]      Your Vitals Were  Most recent update: 2/22/2024  3:15 PM    BP   126/79    Pulse   97    Wt   224 lb 4.8 oz    BMI   36.20 kg/m²          Allergies as of 2/22/2024  Review status set to Review Complete on 2/22/2024       Noted Reaction Type Reactions    Adhesive Tape 11/10/2022   Systemic RASH, OTHER (SEE COMMENTS)    Morphine 04/30/2017   Systemic ITCHING, OTHER (SEE COMMENTS)      Your Current Medications        Dosage    losartan-hydroCHLOROthiazide 50-12.5 MG Oral Tab Take 1.5 tablets by mouth daily.    meclizine 25 MG Oral Tab Take 1 tablet (25 mg total) by mouth 3 (three) times daily as needed.    Meloxicam 15 MG Oral Tab Take 1 tablet (15 mg total) by mouth daily.    Nystatin [id number] UNIT/GM External Powder Apply 1 Application topically 2 (two) times daily.    Omeprazole 40 MG Oral Capsule Delayed Release Take 1 capsule (40 mg total) by mouth daily.    rosuvastatin 10 MG Oral Tab Take 1 tablet (10 mg total) by mouth nightly.    semaglutide 8 MG/3ML Subcutaneous Solution Pen-injector Inject 2 mg into the skin once a week.    metFORMIN 500 MG Oral Tab Take 1 tablet (500 mg total) by mouth daily with breakfast.    Cholecalciferol 50 MCG (2000 UT) Oral Tab Take 1 tablet (2,000 Units total) by mouth daily.      Diagnoses for This Visit    Encounter for gynecological examination without abnormal finding   [103296]  -  Primary  Screening for malignant neoplasm of cervix   [200253]    Screening mammogram for breast cancer   [9456180]    Chronic pelvic pain in female   [248474]    Vaginal odor   [938591]    Vaginal discharge   [864122]             We Ordered the Following     Normal Orders This Visit    Hpv Dna  High Risk , Thin Prep Collect [[id number]  CUSTOM]     THINPREP PAP SMEAR ONLY [[id number] CUSTOM]     ThinPrep PAP Smear [[id number] CUSTOM]     Vaginitis Vaginosis PCR Panel [[id number] CUSTOM]     Future Labs/Procedures Expected by Expires    Hpv Dna  High Risk , Thin Prep Collect [[id number] CUSTOM]  2/22/2024 2/22/2025    [facility] 2D+3D SCREENING BILAT (CPT=77067/02771) [COMBO CPT(R)]  2/22/2024 (Approximate) 2/22/2025    ThinPrep PAP Smear [[id number] CUSTOM]  2/22/2024 2/22/2025    Vaginitis Vaginosis PCR Panel [[id number] CUSTOM]  2/22/2024 (Approximate) 2/22/2025      Future Appointments        Provider Department    5/8/2024 1:20 PM Eliane Perez [facility] - OB/GYN      Imaging Scheduling Instructions     Around February 22, 2024   Imaging:   [facility] 2D+3D SCREENING BILAT (CPT=77067/38979)    Instructions: Your order will generate a \"Scheduling Ticket\" that will be available in [employer] to schedule on your own at a time most convenient to you.      If you do not have a [employer] Account, or if you prefer to speak with someone to schedule your appointment, please call [facility] Scheduling at [phone].                  Did you know that [facility] primary care physicians now offer Video Visits through [employer] for adult patients for a variety of conditions such as allergies, back pain and cold symptoms? Skip the drive and waiting room and online chat with a doctor face-to-face using your web-cam enabled computer or mobile device wherever you are. Video Visits cost $50 and can be paid hassle-free using a credit, debit, or health savings card.  Not active on [employer]? Ask us how to get signed up today!          If you receive a survey from [employer], please take a few minutes to complete it and provide feedback. We strive to deliver the best patient experience and are looking for ways to make improvements. Your feedback will help us do so. For more information on [employer], please visit  [email or web address]           No text in SmartText           No text in SmartText